# Patient Record
Sex: FEMALE | Race: WHITE | NOT HISPANIC OR LATINO | Employment: FULL TIME | RURAL
[De-identification: names, ages, dates, MRNs, and addresses within clinical notes are randomized per-mention and may not be internally consistent; named-entity substitution may affect disease eponyms.]

---

## 2020-06-24 ENCOUNTER — HISTORICAL (OUTPATIENT)
Dept: ADMINISTRATIVE | Facility: HOSPITAL | Age: 50
End: 2020-06-24

## 2020-06-30 LAB
LAB AP CLINICAL INFORMATION: NORMAL
LAB AP COMMENTS: NORMAL
LAB AP GENERAL CAT - HISTORICAL: NORMAL
LAB AP INTERPRETATION/RESULT - HISTORICAL: NEGATIVE
LAB AP SPECIMEN ADEQUACY - HISTORICAL: NORMAL
LAB AP SPECIMEN SUBMITTED - HISTORICAL: NORMAL

## 2021-07-12 ENCOUNTER — OFFICE VISIT (OUTPATIENT)
Dept: FAMILY MEDICINE | Facility: CLINIC | Age: 51
End: 2021-07-12
Payer: COMMERCIAL

## 2021-07-12 VITALS
BODY MASS INDEX: 33.6 KG/M2 | TEMPERATURE: 98 F | HEIGHT: 63 IN | HEART RATE: 77 BPM | OXYGEN SATURATION: 97 % | DIASTOLIC BLOOD PRESSURE: 78 MMHG | WEIGHT: 189.63 LBS | SYSTOLIC BLOOD PRESSURE: 114 MMHG

## 2021-07-12 DIAGNOSIS — Z12.31 SCREENING MAMMOGRAM, ENCOUNTER FOR: ICD-10-CM

## 2021-07-12 DIAGNOSIS — Z12.11 SCREENING FOR COLON CANCER: ICD-10-CM

## 2021-07-12 DIAGNOSIS — Z00.00 ENCOUNTER FOR WELLNESS EXAMINATION: Primary | ICD-10-CM

## 2021-07-12 DIAGNOSIS — R73.9 HYPERGLYCEMIA: ICD-10-CM

## 2021-07-12 DIAGNOSIS — Z13.1 SCREENING FOR DIABETES MELLITUS: ICD-10-CM

## 2021-07-12 DIAGNOSIS — Z13.220 SCREENING FOR LIPOID DISORDERS: ICD-10-CM

## 2021-07-12 DIAGNOSIS — Z12.4 PAP SMEAR FOR CERVICAL CANCER SCREENING: ICD-10-CM

## 2021-07-12 PROCEDURE — 99396 PR PREVENTIVE VISIT,EST,40-64: ICD-10-PCS | Mod: ,,, | Performed by: NURSE PRACTITIONER

## 2021-07-12 PROCEDURE — 82945 GLUCOSE, RANDOM: ICD-10-PCS | Mod: ,,, | Performed by: CLINICAL MEDICAL LABORATORY

## 2021-07-12 PROCEDURE — 99396 PREV VISIT EST AGE 40-64: CPT | Mod: ,,, | Performed by: NURSE PRACTITIONER

## 2021-07-12 PROCEDURE — 80061 LIPID PANEL: ICD-10-PCS | Mod: ,,, | Performed by: CLINICAL MEDICAL LABORATORY

## 2021-07-12 PROCEDURE — 88142 CYTOPATH C/V THIN LAYER: CPT | Mod: GCY | Performed by: NURSE PRACTITIONER

## 2021-07-12 PROCEDURE — 82945 GLUCOSE OTHER FLUID: CPT | Mod: ,,, | Performed by: CLINICAL MEDICAL LABORATORY

## 2021-07-12 PROCEDURE — 80061 LIPID PANEL: CPT | Mod: ,,, | Performed by: CLINICAL MEDICAL LABORATORY

## 2021-07-12 RX ORDER — ROSUVASTATIN CALCIUM 10 MG/1
10 TABLET, COATED ORAL NIGHTLY
COMMUNITY
Start: 2021-07-01 | End: 2021-11-04 | Stop reason: SDUPTHER

## 2021-07-12 RX ORDER — GABAPENTIN 800 MG/1
800 TABLET ORAL 3 TIMES DAILY
COMMUNITY
Start: 2021-05-21 | End: 2021-11-01

## 2021-07-12 RX ORDER — LISINOPRIL AND HYDROCHLOROTHIAZIDE 12.5; 2 MG/1; MG/1
1 TABLET ORAL
COMMUNITY
End: 2021-11-04 | Stop reason: SDUPTHER

## 2021-07-12 RX ORDER — METFORMIN HYDROCHLORIDE 500 MG/1
TABLET ORAL
COMMUNITY
Start: 2021-03-24 | End: 2022-04-13 | Stop reason: SDUPTHER

## 2021-07-13 LAB
CHOLEST SERPL-MCNC: 191 MG/DL (ref 0–200)
CHOLEST/HDLC SERPL: 4 {RATIO}
GLUCOSE SERPL-MCNC: 203 MG/DL (ref 74–106)
HDLC SERPL-MCNC: 48 MG/DL (ref 40–60)
LDLC SERPL CALC-MCNC: 76 MG/DL
LDLC/HDLC SERPL: 1.6 {RATIO}
NONHDLC SERPL-MCNC: 143 MG/DL
TRIGL SERPL-MCNC: 333 MG/DL (ref 35–150)
VLDLC SERPL-MCNC: 67 MG/DL

## 2021-07-14 DIAGNOSIS — R73.9 HYPERGLYCEMIA: Primary | ICD-10-CM

## 2021-07-14 LAB
GH SERPL-MCNC: NORMAL NG/ML
INSULIN SERPL-ACNC: NORMAL U[IU]/ML
LAB AP CLINICAL INFORMATION: NORMAL
LAB AP GYN INTERPRETATION: NEGATIVE
LAB AP PAP DISCLAIMER COMMENTS: NORMAL
RENIN PLAS-CCNC: NORMAL NG/ML/H

## 2021-07-14 PROCEDURE — 83036 HEMOGLOBIN GLYCOSYLATED A1C: CPT | Mod: ,,, | Performed by: CLINICAL MEDICAL LABORATORY

## 2021-07-14 PROCEDURE — 83036 HEMOGLOBIN A1C: ICD-10-PCS | Mod: ,,, | Performed by: CLINICAL MEDICAL LABORATORY

## 2021-07-15 LAB
EST. AVERAGE GLUCOSE BLD GHB EST-MCNC: 184 MG/DL
HBA1C MFR BLD HPLC: 8.1 % (ref 4.5–6.6)

## 2021-10-11 PROBLEM — Z00.00 ENCOUNTER FOR WELLNESS EXAMINATION: Status: RESOLVED | Noted: 2021-07-12 | Resolved: 2021-10-11

## 2021-11-04 ENCOUNTER — OFFICE VISIT (OUTPATIENT)
Dept: FAMILY MEDICINE | Facility: CLINIC | Age: 51
End: 2021-11-04
Payer: COMMERCIAL

## 2021-11-04 VITALS
WEIGHT: 186.38 LBS | SYSTOLIC BLOOD PRESSURE: 127 MMHG | DIASTOLIC BLOOD PRESSURE: 84 MMHG | OXYGEN SATURATION: 96 % | BODY MASS INDEX: 33.02 KG/M2 | HEART RATE: 76 BPM | TEMPERATURE: 97 F | HEIGHT: 63 IN

## 2021-11-04 DIAGNOSIS — I10 ESSENTIAL HYPERTENSION: ICD-10-CM

## 2021-11-04 DIAGNOSIS — E11.9 TYPE 2 DIABETES MELLITUS WITHOUT COMPLICATION, WITHOUT LONG-TERM CURRENT USE OF INSULIN: Primary | ICD-10-CM

## 2021-11-04 LAB
EST. AVERAGE GLUCOSE BLD GHB EST-MCNC: 190 MG/DL
HBA1C MFR BLD HPLC: 8.3 % (ref 4.5–6.6)

## 2021-11-04 PROCEDURE — 99213 OFFICE O/P EST LOW 20 MIN: CPT | Mod: ,,, | Performed by: NURSE PRACTITIONER

## 2021-11-04 PROCEDURE — 83036 HEMOGLOBIN GLYCOSYLATED A1C: CPT | Mod: ,,, | Performed by: CLINICAL MEDICAL LABORATORY

## 2021-11-04 PROCEDURE — 3052F PR MOST RECENT HEMOGLOBIN A1C LEVEL 8.0 - < 9.0%: ICD-10-PCS | Mod: ,,, | Performed by: NURSE PRACTITIONER

## 2021-11-04 PROCEDURE — 83036 HEMOGLOBIN A1C: ICD-10-PCS | Mod: ,,, | Performed by: CLINICAL MEDICAL LABORATORY

## 2021-11-04 PROCEDURE — 99213 PR OFFICE/OUTPT VISIT, EST, LEVL III, 20-29 MIN: ICD-10-PCS | Mod: ,,, | Performed by: NURSE PRACTITIONER

## 2021-11-04 PROCEDURE — 3052F HG A1C>EQUAL 8.0%<EQUAL 9.0%: CPT | Mod: ,,, | Performed by: NURSE PRACTITIONER

## 2021-11-04 RX ORDER — LISINOPRIL AND HYDROCHLOROTHIAZIDE 12.5; 2 MG/1; MG/1
1 TABLET ORAL DAILY
Qty: 90 TABLET | Refills: 1 | Status: SHIPPED | OUTPATIENT
Start: 2021-11-04 | End: 2022-04-13 | Stop reason: SDUPTHER

## 2021-11-04 RX ORDER — GABAPENTIN 800 MG/1
800 TABLET ORAL 3 TIMES DAILY
Qty: 270 TABLET | Refills: 2 | Status: SHIPPED | OUTPATIENT
Start: 2021-11-04 | End: 2022-04-13 | Stop reason: SDUPTHER

## 2021-11-04 RX ORDER — ROSUVASTATIN CALCIUM 10 MG/1
10 TABLET, COATED ORAL NIGHTLY
Qty: 90 TABLET | Refills: 1 | Status: SHIPPED | OUTPATIENT
Start: 2021-11-04 | End: 2022-04-13 | Stop reason: SDUPTHER

## 2022-04-13 ENCOUNTER — OFFICE VISIT (OUTPATIENT)
Dept: FAMILY MEDICINE | Facility: CLINIC | Age: 52
End: 2022-04-13
Payer: COMMERCIAL

## 2022-04-13 VITALS
HEIGHT: 63 IN | TEMPERATURE: 98 F | WEIGHT: 177.81 LBS | HEART RATE: 81 BPM | DIASTOLIC BLOOD PRESSURE: 74 MMHG | OXYGEN SATURATION: 98 % | SYSTOLIC BLOOD PRESSURE: 115 MMHG | BODY MASS INDEX: 31.5 KG/M2

## 2022-04-13 DIAGNOSIS — E78.5 HYPERLIPIDEMIA, UNSPECIFIED HYPERLIPIDEMIA TYPE: ICD-10-CM

## 2022-04-13 DIAGNOSIS — I10 ESSENTIAL HYPERTENSION: ICD-10-CM

## 2022-04-13 DIAGNOSIS — G62.9 NEUROPATHY: ICD-10-CM

## 2022-04-13 DIAGNOSIS — E11.9 TYPE 2 DIABETES MELLITUS WITHOUT COMPLICATION, WITHOUT LONG-TERM CURRENT USE OF INSULIN: Primary | ICD-10-CM

## 2022-04-13 LAB
BASOPHILS # BLD AUTO: 0.06 K/UL (ref 0–0.2)
BASOPHILS NFR BLD AUTO: 0.8 % (ref 0–1)
BILIRUB UR QL STRIP: NEGATIVE
CLARITY UR: CLEAR
COLOR UR: YELLOW
DIFFERENTIAL METHOD BLD: ABNORMAL
EOSINOPHIL # BLD AUTO: 0.29 K/UL (ref 0–0.5)
EOSINOPHIL NFR BLD AUTO: 3.7 % (ref 1–4)
ERYTHROCYTE [DISTWIDTH] IN BLOOD BY AUTOMATED COUNT: 12.2 % (ref 11.5–14.5)
GLUCOSE UR STRIP-MCNC: 500 MG/DL
HCT VFR BLD AUTO: 43.4 % (ref 38–47)
HGB BLD-MCNC: 14.7 G/DL (ref 12–16)
IMM GRANULOCYTES # BLD AUTO: 0.02 K/UL (ref 0–0.04)
IMM GRANULOCYTES NFR BLD: 0.3 % (ref 0–0.4)
KETONES UR STRIP-SCNC: ABNORMAL MG/DL
LEUKOCYTE ESTERASE UR QL STRIP: ABNORMAL
LYMPHOCYTES # BLD AUTO: 1.91 K/UL (ref 1–4.8)
LYMPHOCYTES NFR BLD AUTO: 24.4 % (ref 27–41)
MCH RBC QN AUTO: 30.6 PG (ref 27–31)
MCHC RBC AUTO-ENTMCNC: 33.9 G/DL (ref 32–36)
MCV RBC AUTO: 90.2 FL (ref 80–96)
MONOCYTES # BLD AUTO: 0.69 K/UL (ref 0–0.8)
MONOCYTES NFR BLD AUTO: 8.8 % (ref 2–6)
MPC BLD CALC-MCNC: 9.7 FL (ref 9.4–12.4)
NEUTROPHILS # BLD AUTO: 4.85 K/UL (ref 1.8–7.7)
NEUTROPHILS NFR BLD AUTO: 62 % (ref 53–65)
NITRITE UR QL STRIP: NEGATIVE
NRBC # BLD AUTO: 0 X10E3/UL
NRBC, AUTO (.00): 0 %
PH UR STRIP: 6 PH UNITS
PLATELET # BLD AUTO: 346 K/UL (ref 150–400)
PROT UR QL STRIP: NEGATIVE
RBC # BLD AUTO: 4.81 M/UL (ref 4.2–5.4)
RBC # UR STRIP: ABNORMAL /UL
RBC #/AREA URNS HPF: ABNORMAL /HPF
SP GR UR STRIP: 1.02
SQUAMOUS #/AREA URNS LPF: ABNORMAL /LPF
UROBILINOGEN UR STRIP-ACNC: 1 MG/DL
WBC # BLD AUTO: 7.82 K/UL (ref 4.5–11)
WBC #/AREA URNS HPF: ABNORMAL /HPF

## 2022-04-13 PROCEDURE — 1160F PR REVIEW ALL MEDS BY PRESCRIBER/CLIN PHARMACIST DOCUMENTED: ICD-10-PCS | Mod: ,,, | Performed by: REGISTERED NURSE

## 2022-04-13 PROCEDURE — 3008F BODY MASS INDEX DOCD: CPT | Mod: ,,, | Performed by: REGISTERED NURSE

## 2022-04-13 PROCEDURE — 3051F PR MOST RECENT HEMOGLOBIN A1C LEVEL 7.0 - < 8.0%: ICD-10-PCS | Mod: ,,, | Performed by: REGISTERED NURSE

## 2022-04-13 PROCEDURE — 3051F HG A1C>EQUAL 7.0%<8.0%: CPT | Mod: ,,, | Performed by: REGISTERED NURSE

## 2022-04-13 PROCEDURE — 3078F PR MOST RECENT DIASTOLIC BLOOD PRESSURE < 80 MM HG: ICD-10-PCS | Mod: ,,, | Performed by: REGISTERED NURSE

## 2022-04-13 PROCEDURE — 3078F DIAST BP <80 MM HG: CPT | Mod: ,,, | Performed by: REGISTERED NURSE

## 2022-04-13 PROCEDURE — 1159F MED LIST DOCD IN RCRD: CPT | Mod: ,,, | Performed by: REGISTERED NURSE

## 2022-04-13 PROCEDURE — 82570 ASSAY OF URINE CREATININE: CPT | Mod: ,,, | Performed by: CLINICAL MEDICAL LABORATORY

## 2022-04-13 PROCEDURE — 81001 URINALYSIS AUTO W/SCOPE: CPT | Mod: ,,, | Performed by: CLINICAL MEDICAL LABORATORY

## 2022-04-13 PROCEDURE — 80053 COMPREHENSIVE METABOLIC PANEL: ICD-10-PCS | Mod: ,,, | Performed by: CLINICAL MEDICAL LABORATORY

## 2022-04-13 PROCEDURE — 82043 MICROALBUMIN / CREATININE RATIO URINE: ICD-10-PCS | Mod: ,,, | Performed by: CLINICAL MEDICAL LABORATORY

## 2022-04-13 PROCEDURE — 3008F PR BODY MASS INDEX (BMI) DOCUMENTED: ICD-10-PCS | Mod: ,,, | Performed by: REGISTERED NURSE

## 2022-04-13 PROCEDURE — 1159F PR MEDICATION LIST DOCUMENTED IN MEDICAL RECORD: ICD-10-PCS | Mod: ,,, | Performed by: REGISTERED NURSE

## 2022-04-13 PROCEDURE — 85025 COMPLETE CBC W/AUTO DIFF WBC: CPT | Mod: ,,, | Performed by: CLINICAL MEDICAL LABORATORY

## 2022-04-13 PROCEDURE — 1160F RVW MEDS BY RX/DR IN RCRD: CPT | Mod: ,,, | Performed by: REGISTERED NURSE

## 2022-04-13 PROCEDURE — 80053 COMPREHEN METABOLIC PANEL: CPT | Mod: ,,, | Performed by: CLINICAL MEDICAL LABORATORY

## 2022-04-13 PROCEDURE — 82043 UR ALBUMIN QUANTITATIVE: CPT | Mod: ,,, | Performed by: CLINICAL MEDICAL LABORATORY

## 2022-04-13 PROCEDURE — 99214 PR OFFICE/OUTPT VISIT, EST, LEVL IV, 30-39 MIN: ICD-10-PCS | Mod: ,,, | Performed by: REGISTERED NURSE

## 2022-04-13 PROCEDURE — 81001 URINALYSIS, REFLEX TO URINE CULTURE: ICD-10-PCS | Mod: ,,, | Performed by: CLINICAL MEDICAL LABORATORY

## 2022-04-13 PROCEDURE — 85025 CBC WITH DIFFERENTIAL: ICD-10-PCS | Mod: ,,, | Performed by: CLINICAL MEDICAL LABORATORY

## 2022-04-13 PROCEDURE — 83036 HEMOGLOBIN GLYCOSYLATED A1C: CPT | Mod: ,,, | Performed by: CLINICAL MEDICAL LABORATORY

## 2022-04-13 PROCEDURE — 80061 LIPID PANEL: ICD-10-PCS | Mod: ,,, | Performed by: CLINICAL MEDICAL LABORATORY

## 2022-04-13 PROCEDURE — 3061F PR NEG MICROALBUMINURIA RESULT DOCUMENTED/REVIEW: ICD-10-PCS | Mod: ,,, | Performed by: REGISTERED NURSE

## 2022-04-13 PROCEDURE — 99214 OFFICE O/P EST MOD 30 MIN: CPT | Mod: ,,, | Performed by: REGISTERED NURSE

## 2022-04-13 PROCEDURE — 3066F NEPHROPATHY DOC TX: CPT | Mod: ,,, | Performed by: REGISTERED NURSE

## 2022-04-13 PROCEDURE — 3061F NEG MICROALBUMINURIA REV: CPT | Mod: ,,, | Performed by: REGISTERED NURSE

## 2022-04-13 PROCEDURE — 80061 LIPID PANEL: CPT | Mod: ,,, | Performed by: CLINICAL MEDICAL LABORATORY

## 2022-04-13 PROCEDURE — 3074F SYST BP LT 130 MM HG: CPT | Mod: ,,, | Performed by: REGISTERED NURSE

## 2022-04-13 PROCEDURE — 3066F PR DOCUMENTATION OF TREATMENT FOR NEPHROPATHY: ICD-10-PCS | Mod: ,,, | Performed by: REGISTERED NURSE

## 2022-04-13 PROCEDURE — 3074F PR MOST RECENT SYSTOLIC BLOOD PRESSURE < 130 MM HG: ICD-10-PCS | Mod: ,,, | Performed by: REGISTERED NURSE

## 2022-04-13 PROCEDURE — 4010F PR ACE/ARB THEARPY RXD/TAKEN: ICD-10-PCS | Mod: ,,, | Performed by: REGISTERED NURSE

## 2022-04-13 PROCEDURE — 4010F ACE/ARB THERAPY RXD/TAKEN: CPT | Mod: ,,, | Performed by: REGISTERED NURSE

## 2022-04-13 PROCEDURE — 82570 MICROALBUMIN / CREATININE RATIO URINE: ICD-10-PCS | Mod: ,,, | Performed by: CLINICAL MEDICAL LABORATORY

## 2022-04-13 PROCEDURE — 83036 HEMOGLOBIN A1C: ICD-10-PCS | Mod: ,,, | Performed by: CLINICAL MEDICAL LABORATORY

## 2022-04-13 RX ORDER — METFORMIN HYDROCHLORIDE 500 MG/1
TABLET ORAL
Qty: 225 TABLET | Refills: 1 | Status: SHIPPED | OUTPATIENT
Start: 2022-04-13 | End: 2022-07-27 | Stop reason: SDUPTHER

## 2022-04-13 RX ORDER — ROSUVASTATIN CALCIUM 10 MG/1
10 TABLET, COATED ORAL NIGHTLY
Qty: 90 TABLET | Refills: 1 | Status: SHIPPED | OUTPATIENT
Start: 2022-04-13 | End: 2022-04-19 | Stop reason: DRUGHIGH

## 2022-04-13 RX ORDER — LISINOPRIL AND HYDROCHLOROTHIAZIDE 12.5; 2 MG/1; MG/1
1 TABLET ORAL DAILY
Qty: 90 TABLET | Refills: 1 | Status: SHIPPED | OUTPATIENT
Start: 2022-04-13 | End: 2022-07-27 | Stop reason: SDUPTHER

## 2022-04-13 RX ORDER — GABAPENTIN 800 MG/1
800 TABLET ORAL 3 TIMES DAILY
Qty: 270 TABLET | Refills: 1 | Status: SHIPPED | OUTPATIENT
Start: 2022-04-13 | End: 2022-11-16 | Stop reason: SDUPTHER

## 2022-04-13 NOTE — PROGRESS NOTES
WADE Means        PATIENT NAME: Alisa Olguin  : 1970  DATE: 22  MRN: [id number]      Billing Provider: WADE Means  Level of Service: AL OFFICE/OUTPT VISIT, EST, LEVL IV, 30-39 MIN  Patient PCP Information     Provider PCP Type    WADE Means General          Reason for Visit / Chief Complaint: Establish Care and Diabetes       Update PCP  Update Chief Complaint         History of Present Illness / Problem Focused Workflow      HPI Miss Cuellar is a 50 y/o WF here to establish care with new pcp. She needs medication refills and lab work.     Review of Systems     Review of Systems   Constitutional: Negative.    HENT: Negative.    Respiratory: Negative for cough, choking, chest tightness and shortness of breath.    Cardiovascular: Negative for chest pain and leg swelling.   Gastrointestinal: Negative for abdominal pain, change in bowel habit, diarrhea, nausea, vomiting, reflux and change in bowel habit.   Genitourinary: Negative.  Negative for decreased urine volume, difficulty urinating, dysuria, frequency, hematuria and urgency.   Musculoskeletal: Positive for leg pain.   Integumentary:  Negative.   Neurological: Positive for numbness and headaches.   Psychiatric/Behavioral: Negative.       Medical / Social / Family History     Past Medical History:   Diagnosis Date    Diabetes mellitus, type 2     Hyperlipidemia     Hypertension        Past Surgical History:   Procedure Laterality Date    TUBAL LIGATION       Social History  Ms. Alisa Olguin  reports that she has never smoked. She has never used smokeless tobacco. She reports previous alcohol use. She reports that she does not use drugs.    Family History  Ms. Alisa Olguin's family history is not on file.    Medications and Allergies     Medications  No outpatient medications have been marked as taking for the 22 encounter (Office Visit) with WADE Means.     Allergies  Review of patient's  allergies indicates:  No Known Allergies    Physical Examination     Vitals:    04/13/22 0838   BP: 115/74   Pulse: 81   Temp: 98.2 °F (36.8 °C)     Physical Exam  Vitals and nursing note reviewed.   Constitutional:       Appearance: Normal appearance. She is obese.   HENT:      Head: Normocephalic and atraumatic.   Cardiovascular:      Rate and Rhythm: Normal rate and regular rhythm.      Pulses:           Dorsalis pedis pulses are 2+ on the right side and 2+ on the left side.        Posterior tibial pulses are 2+ on the right side and 2+ on the left side.      Heart sounds: Normal heart sounds.   Pulmonary:      Effort: Pulmonary effort is normal.      Breath sounds: Normal breath sounds.   Musculoskeletal:         General: Normal range of motion.      Cervical back: Normal range of motion.   Feet:      Right foot:      Protective Sensation: 2 sites tested. 2 sites sensed.      Skin integrity: Dry skin present.      Toenail Condition: Right toenails are abnormally thick.      Left foot:      Protective Sensation: 2 sites tested. 2 sites sensed.      Skin integrity: Dry skin present.      Toenail Condition: Left toenails are abnormally thick.   Skin:     General: Skin is warm and dry.   Neurological:      Mental Status: She is alert and oriented to person, place, and time.      Comments: Peripheral neuropathy affecting bilateral lower extremities secondary to diabetes.         Assessment and Plan (including Health Maintenance)      Problem List  Smart Sets  Document Outside HM   Plan:   Type 2 diabetes mellitus without complication, without long-term current use of insulin  -     metFORMIN (GLUCOPHAGE) 500 MG tablet; TAKE 1 & 1 2 (ONE & ONE HALF) TABLETS BY MOUTH IN THE MORNING AND 1 NIGHTLY  Dispense: 225 tablet; Refill: 1  -     CBC Auto Differential; Future; Expected date: 04/13/2022  -     Microalbumin/Creatinine Ratio, Urine; Future  -     Urinalysis, Reflex to Urine Culture; Future; Expected date:  04/13/2022  -     Hemoglobin A1C; Future; Expected date: 04/13/2022  -     Urinalysis, Microscopic    Essential hypertension  -     lisinopriL-hydrochlorothiazide (PRINZIDE,ZESTORETIC) 20-12.5 mg per tablet; Take 1 tablet by mouth once daily.  Dispense: 90 tablet; Refill: 1  -     Comprehensive Metabolic Panel; Future; Expected date: 04/13/2022  -     Microalbumin/Creatinine Ratio, Urine; Future  -     Urinalysis, Reflex to Urine Culture; Future; Expected date: 04/13/2022  -     Urinalysis, Microscopic    Hyperlipidemia, unspecified hyperlipidemia type  -     Discontinue: rosuvastatin (CRESTOR) 10 MG tablet; Take 1 tablet (10 mg total) by mouth nightly.  Dispense: 90 tablet; Refill: 1  -     Lipid Panel; Future; Expected date: 04/13/2022  -     rosuvastatin (CRESTOR) 20 MG tablet; Take 1 tablet (20 mg total) by mouth once daily.  Dispense: 90 tablet; Refill: 0    Neuropathy  -     gabapentin (NEURONTIN) 800 MG tablet; Take 1 tablet (800 mg total) by mouth 3 (three) times daily.  Dispense: 270 tablet; Refill: 1      Health Maintenance Due   Topic Date Due    Hepatitis C Screening  Never done    COVID-19 Vaccine (1) Never done    Foot Exam  Never done    Eye Exam  Never done    HIV Screening  Never done    TETANUS VACCINE  Never done    Mammogram  Never done    Colorectal Cancer Screening  Never done    Shingles Vaccine (1 of 2) Never done     Problem List Items Addressed This Visit    None     Visit Diagnoses     Type 2 diabetes mellitus without complication, without long-term current use of insulin    -  Primary    Relevant Medications    metFORMIN (GLUCOPHAGE) 500 MG tablet    Other Relevant Orders    CBC Auto Differential (Completed)    Microalbumin/Creatinine Ratio, Urine (Completed)    Urinalysis, Reflex to Urine Culture (Completed)    Hemoglobin A1C (Completed)    Urinalysis, Microscopic (Completed)    Essential hypertension        Relevant Medications    lisinopriL-hydrochlorothiazide  (PRINZIDE,ZESTORETIC) 20-12.5 mg per tablet    Other Relevant Orders    Comprehensive Metabolic Panel (Completed)    Microalbumin/Creatinine Ratio, Urine (Completed)    Urinalysis, Reflex to Urine Culture (Completed)    Urinalysis, Microscopic (Completed)    Hyperlipidemia, unspecified hyperlipidemia type        Relevant Medications    rosuvastatin (CRESTOR) 20 MG tablet    Other Relevant Orders    Lipid Panel (Completed)    Neuropathy        Relevant Medications    gabapentin (NEURONTIN) 800 MG tablet        Health Maintenance Topics with due status: Not Due       Topic Last Completion Date    Cervical Cancer Screening 2021    Low Dose Statin 2022    Diabetes Urine Screening 2022    Lipid Panel 2022    Hemoglobin A1c 2022     Future Appointments   Date Time Provider Department Center   2022  8:00 AM WADE Means [facility]      Patient Instructions   Do not stop or start any new medications without consulting with your provider. Make sure you are drinking plenty of water, at least 6-8 glasses per day.     We will call you with your lab results when results are available.     To avoid serious foot problems that could result in losing a toe, foot or leg, follow these guidelines.  Inspect your feet daily. Check for cuts, blisters, redness, swelling or nail problems. Use a magnifying hand mirror to look at the bottom of your feet. Call your doctor if you notice anything.    Bathe feet in lukewarm, never hot, water. Keep your feet clean by washing them daily. Use only lukewarm water--the temperature you would use on a  baby.    Be gentle when bathing your feet. Wash them using a soft washcloth or sponge. Dry by blotting or patting and carefully dry between the toes.    Moisturize your feet but not between your toes. Use a moisturizer daily to keep dry skin from itching or cracking. But don't moisturize between the toes--that could encourage a fungal  infection.    Cut nails carefully. Cut them straight across and file the edges. Dont cut nails too short, as this could lead to ingrown toenails. If you have concerns about your nails, consult your doctor.    Never treat corns or calluses yourself. No bathroom surgery or medicated pads. Visit your doctor for appropriate treatment.    Wear clean, dry socks. Change them daily.    Consider socks made specifically for patients living with diabetes. These socks have extra cushioning, do not have elastic tops, are higher than the ankle and are made from fibers that wick moisture away from the skin.    Wear socks to bed. If your feet get cold at night, wear socks. Never use a heating pad or a hot water bottle.    Shake out your shoes and feel the inside before wearing. Remember, your feet may not be able to feel a pebble or other foreign object, so always inspect your shoes before putting them on.    Keep your feet warm and dry. Dont let your feet get wet in snow or rain. Wear warm socks and shoes in winter.    Consider using an antiperspirant on the soles of your feet. This is helpful if you have excessive sweating of the feet.    Never walk barefoot. Not even at home! Always wear shoes or slippers. You could step on something and get a scratch or cut.    Take care of your diabetes. Keep your blood sugar levels under control.    Do not smoke. Smoking restricts blood flow in your feet.    Get periodic foot exams. Seeing your foot and ankle surgeon on a regular basis can help prevent the foot complications of diabetes.    Follow up in about 6 months (around 10/13/2022).     Signature:  WADE Means      Date of encounter: 4/13/22

## 2022-04-13 NOTE — PATIENT INSTRUCTIONS
Do not stop or start any new medications without consulting with your provider. Make sure you are drinking plenty of water, at least 6-8 glasses per day.     We will call you with your lab results when results are available.     To avoid serious foot problems that could result in losing a toe, foot or leg, follow these guidelines.  Inspect your feet daily. Check for cuts, blisters, redness, swelling or nail problems. Use a magnifying hand mirror to look at the bottom of your feet. Call your doctor if you notice anything.    Bathe feet in lukewarm, never hot, water. Keep your feet clean by washing them daily. Use only lukewarm water--the temperature you would use on a  baby.    Be gentle when bathing your feet. Wash them using a soft washcloth or sponge. Dry by blotting or patting and carefully dry between the toes.    Moisturize your feet but not between your toes. Use a moisturizer daily to keep dry skin from itching or cracking. But don't moisturize between the toes--that could encourage a fungal infection.    Cut nails carefully. Cut them straight across and file the edges. Dont cut nails too short, as this could lead to ingrown toenails. If you have concerns about your nails, consult your doctor.    Never treat corns or calluses yourself. No bathroom surgery or medicated pads. Visit your doctor for appropriate treatment.    Wear clean, dry socks. Change them daily.    Consider socks made specifically for patients living with diabetes. These socks have extra cushioning, do not have elastic tops, are higher than the ankle and are made from fibers that wick moisture away from the skin.    Wear socks to bed. If your feet get cold at night, wear socks. Never use a heating pad or a hot water bottle.    Shake out your shoes and feel the inside before wearing. Remember, your feet may not be able to feel a pebble or other foreign object, so always inspect your shoes before putting them on.    Keep your feet  warm and dry. Dont let your feet get wet in snow or rain. Wear warm socks and shoes in winter.    Consider using an antiperspirant on the soles of your feet. This is helpful if you have excessive sweating of the feet.    Never walk barefoot. Not even at home! Always wear shoes or slippers. You could step on something and get a scratch or cut.    Take care of your diabetes. Keep your blood sugar levels under control.    Do not smoke. Smoking restricts blood flow in your feet.    Get periodic foot exams. Seeing your foot and ankle surgeon on a regular basis can help prevent the foot complications of diabetes.

## 2022-04-14 LAB
ALBUMIN SERPL BCP-MCNC: 4.1 G/DL (ref 3.5–5)
ALBUMIN/GLOB SERPL: 1.1 {RATIO}
ALP SERPL-CCNC: 97 U/L (ref 41–108)
ALT SERPL W P-5'-P-CCNC: 48 U/L (ref 13–56)
ANION GAP SERPL CALCULATED.3IONS-SCNC: 11 MMOL/L (ref 7–16)
AST SERPL W P-5'-P-CCNC: 19 U/L (ref 15–37)
BILIRUB SERPL-MCNC: 0.5 MG/DL (ref 0–1.2)
BUN SERPL-MCNC: 21 MG/DL (ref 7–18)
BUN/CREAT SERPL: 23 (ref 6–20)
CALCIUM SERPL-MCNC: 9.5 MG/DL (ref 8.5–10.1)
CHLORIDE SERPL-SCNC: 102 MMOL/L (ref 98–107)
CHOLEST SERPL-MCNC: 310 MG/DL (ref 0–200)
CHOLEST/HDLC SERPL: 6.2 {RATIO}
CO2 SERPL-SCNC: 29 MMOL/L (ref 21–32)
CREAT SERPL-MCNC: 0.9 MG/DL (ref 0.55–1.02)
CREAT UR-MCNC: 77 MG/DL (ref 28–219)
EST. AVERAGE GLUCOSE BLD GHB EST-MCNC: 157 MG/DL
GLOBULIN SER-MCNC: 3.7 G/DL (ref 2–4)
GLUCOSE SERPL-MCNC: 166 MG/DL (ref 74–106)
HBA1C MFR BLD HPLC: 7.3 % (ref 4.5–6.6)
HDLC SERPL-MCNC: 50 MG/DL (ref 40–60)
LDLC SERPL CALC-MCNC: 203 MG/DL
LDLC/HDLC SERPL: 4.1 {RATIO}
MICROALBUMIN UR-MCNC: 2.1 MG/DL (ref 0–2.8)
MICROALBUMIN/CREAT RATIO PNL UR: 27.3 MG/G (ref 0–30)
NONHDLC SERPL-MCNC: 260 MG/DL
POTASSIUM SERPL-SCNC: 4.8 MMOL/L (ref 3.5–5.1)
PROT SERPL-MCNC: 7.8 G/DL (ref 6.4–8.2)
SODIUM SERPL-SCNC: 137 MMOL/L (ref 136–145)
TRIGL SERPL-MCNC: 286 MG/DL (ref 35–150)
VLDLC SERPL-MCNC: 57 MG/DL

## 2022-04-19 ENCOUNTER — TELEPHONE (OUTPATIENT)
Dept: FAMILY MEDICINE | Facility: CLINIC | Age: 52
End: 2022-04-19
Payer: COMMERCIAL

## 2022-04-19 RX ORDER — ROSUVASTATIN CALCIUM 20 MG/1
20 TABLET, COATED ORAL DAILY
Qty: 90 TABLET | Refills: 0 | Status: SHIPPED | OUTPATIENT
Start: 2022-04-19 | End: 2022-07-27 | Stop reason: SDUPTHER

## 2022-04-19 NOTE — TELEPHONE ENCOUNTER
----- Message from WADE Means sent at 4/19/2022  2:23 PM CDT -----  Please let Mrs. Olguin know her A1C has improved to 7.3 but her cholesterol has doubled and her Triglycerides are still elevated. She needs to increase her Crestor from 10mg daily to 20mg daily, she can take 2 of the 10mg tablets until prescription is gone and then  the new prescription I am sending to the pharmacy. She needs to get an OTC fish oil and take 1 capsule every night to help with Triglycerides and cholesterol and she needs to cut back on whole dairy products, this includes cheese, egg yolks, fried foods, and processed meats like hot dogs, sandwich meat, and diaz. She needs to return in about 3-4 months for repeat on her lab work. No changes made to diabetic medications, keep taking everything else the same way.

## 2022-04-19 NOTE — TELEPHONE ENCOUNTER
Discussed with pt; states has been out of crestor 10mg for a while; will  new rx for crestor 20mg and start taking; will rtc in 3-4 weeks for repeat lab.

## 2022-05-11 ENCOUNTER — OFFICE VISIT (OUTPATIENT)
Dept: FAMILY MEDICINE | Facility: CLINIC | Age: 52
End: 2022-05-11
Payer: COMMERCIAL

## 2022-05-11 VITALS
TEMPERATURE: 98 F | DIASTOLIC BLOOD PRESSURE: 84 MMHG | BODY MASS INDEX: 32.25 KG/M2 | SYSTOLIC BLOOD PRESSURE: 119 MMHG | WEIGHT: 182 LBS | OXYGEN SATURATION: 98 % | HEIGHT: 63 IN | HEART RATE: 68 BPM

## 2022-05-11 DIAGNOSIS — R53.83 FATIGUE, UNSPECIFIED TYPE: Primary | ICD-10-CM

## 2022-05-11 DIAGNOSIS — R53.1 WEAKNESS: ICD-10-CM

## 2022-05-11 LAB
25(OH)D3 SERPL-MCNC: 24 NG/ML
FOLATE SERPL-MCNC: 16.3 NG/ML (ref 3.1–17.5)
T4 FREE SERPL-MCNC: 1.08 NG/DL (ref 0.76–1.46)
TSH SERPL DL<=0.005 MIU/L-ACNC: 1.4 UIU/ML (ref 0.36–3.74)
VIT B12 SERPL-MCNC: 147 PG/ML (ref 193–986)

## 2022-05-11 PROCEDURE — 82607 VITAMIN B12/FOLATE, SERUM PANEL: ICD-10-PCS | Mod: ,,, | Performed by: CLINICAL MEDICAL LABORATORY

## 2022-05-11 PROCEDURE — 84443 ASSAY THYROID STIM HORMONE: CPT | Mod: ,,, | Performed by: CLINICAL MEDICAL LABORATORY

## 2022-05-11 PROCEDURE — 84443 TSH: ICD-10-PCS | Mod: ,,, | Performed by: CLINICAL MEDICAL LABORATORY

## 2022-05-11 PROCEDURE — 1159F PR MEDICATION LIST DOCUMENTED IN MEDICAL RECORD: ICD-10-PCS | Mod: ,,, | Performed by: REGISTERED NURSE

## 2022-05-11 PROCEDURE — 3074F SYST BP LT 130 MM HG: CPT | Mod: ,,, | Performed by: REGISTERED NURSE

## 2022-05-11 PROCEDURE — 3061F NEG MICROALBUMINURIA REV: CPT | Mod: ,,, | Performed by: REGISTERED NURSE

## 2022-05-11 PROCEDURE — 3051F PR MOST RECENT HEMOGLOBIN A1C LEVEL 7.0 - < 8.0%: ICD-10-PCS | Mod: ,,, | Performed by: REGISTERED NURSE

## 2022-05-11 PROCEDURE — 1160F RVW MEDS BY RX/DR IN RCRD: CPT | Mod: ,,, | Performed by: REGISTERED NURSE

## 2022-05-11 PROCEDURE — 4010F PR ACE/ARB THEARPY RXD/TAKEN: ICD-10-PCS | Mod: ,,, | Performed by: REGISTERED NURSE

## 2022-05-11 PROCEDURE — 84439 T4, FREE: ICD-10-PCS | Mod: ,,, | Performed by: CLINICAL MEDICAL LABORATORY

## 2022-05-11 PROCEDURE — 99214 OFFICE O/P EST MOD 30 MIN: CPT | Mod: ,,, | Performed by: REGISTERED NURSE

## 2022-05-11 PROCEDURE — 4010F ACE/ARB THERAPY RXD/TAKEN: CPT | Mod: ,,, | Performed by: REGISTERED NURSE

## 2022-05-11 PROCEDURE — 82306 VITAMIN D 25 HYDROXY: CPT | Mod: ,,, | Performed by: CLINICAL MEDICAL LABORATORY

## 2022-05-11 PROCEDURE — 99214 PR OFFICE/OUTPT VISIT, EST, LEVL IV, 30-39 MIN: ICD-10-PCS | Mod: ,,, | Performed by: REGISTERED NURSE

## 2022-05-11 PROCEDURE — 1160F PR REVIEW ALL MEDS BY PRESCRIBER/CLIN PHARMACIST DOCUMENTED: ICD-10-PCS | Mod: ,,, | Performed by: REGISTERED NURSE

## 2022-05-11 PROCEDURE — 1159F MED LIST DOCD IN RCRD: CPT | Mod: ,,, | Performed by: REGISTERED NURSE

## 2022-05-11 PROCEDURE — 82306 VITAMIN D: ICD-10-PCS | Mod: ,,, | Performed by: CLINICAL MEDICAL LABORATORY

## 2022-05-11 PROCEDURE — 3074F PR MOST RECENT SYSTOLIC BLOOD PRESSURE < 130 MM HG: ICD-10-PCS | Mod: ,,, | Performed by: REGISTERED NURSE

## 2022-05-11 PROCEDURE — 3061F PR NEG MICROALBUMINURIA RESULT DOCUMENTED/REVIEW: ICD-10-PCS | Mod: ,,, | Performed by: REGISTERED NURSE

## 2022-05-11 PROCEDURE — 3079F DIAST BP 80-89 MM HG: CPT | Mod: ,,, | Performed by: REGISTERED NURSE

## 2022-05-11 PROCEDURE — 3066F PR DOCUMENTATION OF TREATMENT FOR NEPHROPATHY: ICD-10-PCS | Mod: ,,, | Performed by: REGISTERED NURSE

## 2022-05-11 PROCEDURE — 82607 VITAMIN B-12: CPT | Mod: ,,, | Performed by: CLINICAL MEDICAL LABORATORY

## 2022-05-11 PROCEDURE — 82746 ASSAY OF FOLIC ACID SERUM: CPT | Mod: ,,, | Performed by: CLINICAL MEDICAL LABORATORY

## 2022-05-11 PROCEDURE — 82746 VITAMIN B12/FOLATE, SERUM PANEL: ICD-10-PCS | Mod: ,,, | Performed by: CLINICAL MEDICAL LABORATORY

## 2022-05-11 PROCEDURE — 84439 ASSAY OF FREE THYROXINE: CPT | Mod: ,,, | Performed by: CLINICAL MEDICAL LABORATORY

## 2022-05-11 PROCEDURE — 3066F NEPHROPATHY DOC TX: CPT | Mod: ,,, | Performed by: REGISTERED NURSE

## 2022-05-11 PROCEDURE — 3079F PR MOST RECENT DIASTOLIC BLOOD PRESSURE 80-89 MM HG: ICD-10-PCS | Mod: ,,, | Performed by: REGISTERED NURSE

## 2022-05-11 PROCEDURE — 3008F BODY MASS INDEX DOCD: CPT | Mod: ,,, | Performed by: REGISTERED NURSE

## 2022-05-11 PROCEDURE — 3008F PR BODY MASS INDEX (BMI) DOCUMENTED: ICD-10-PCS | Mod: ,,, | Performed by: REGISTERED NURSE

## 2022-05-11 PROCEDURE — 3051F HG A1C>EQUAL 7.0%<8.0%: CPT | Mod: ,,, | Performed by: REGISTERED NURSE

## 2022-05-11 RX ORDER — CYANOCOBALAMIN 1000 UG/ML
1000 INJECTION, SOLUTION INTRAMUSCULAR; SUBCUTANEOUS
Status: DISCONTINUED | OUTPATIENT
Start: 2022-05-11 | End: 2022-05-11

## 2022-05-11 NOTE — PROGRESS NOTES
WADE Means        PATIENT NAME: Alisa Olguin  : 1970  DATE: 22  MRN: [id number]      Billing Provider: WADE Means  Level of Service: NE OFFICE/OUTPT VISIT, EST, LEVL IV, 30-39 MIN  Patient PCP Information     Provider PCP Type    WADE Means General          Reason for Visit / Chief Complaint: Follow-up       Update PCP  Update Chief Complaint         History of Present Illness / Problem Focused Workflow      HPI Mrs. Olguin is here for follow-up on recent medication changes and review of lab work. We recently discussed her elevated cholesterol and started her on Crestor and OTC fish oil for high cholesterol and elevated triglycerides. She has questions about diet changes and ways to help lower her cholesterol. Good tolerance to new medication repor. She is concerned because of recent hair loss/hair growth changes, increased fatigue and weakness stating she does not have any energy. She reports having history of low vitamin d but has not taken supplements in 1-2 years.     Review of Systems     Review of Systems   Constitutional: Positive for activity change and fatigue. Negative for appetite change, chills and fever.   HENT: Negative.    Respiratory: Negative for cough, chest tightness and shortness of breath.    Cardiovascular: Negative for chest pain and leg swelling.   Gastrointestinal: Negative for abdominal pain, change in bowel habit, diarrhea, nausea, vomiting, reflux and change in bowel habit.   Genitourinary: Negative.    Musculoskeletal: Positive for arthralgias.   Neurological: Negative.    Psychiatric/Behavioral: Negative.       Medical / Social / Family History     Past Medical History:   Diagnosis Date    Diabetes mellitus, type 2     Hyperlipidemia     Hypertension        Past Surgical History:   Procedure Laterality Date    TUBAL LIGATION         Social History  Ms. Alisa Olguin  reports that she has never smoked. She has never used smokeless  tobacco. She reports previous alcohol use. She reports that she does not use drugs.    Family History  Ms. Alisa Olguin's family history is not on file.    Medications and Allergies     Medications  No outpatient medications have been marked as taking for the 5/11/22 encounter (Office Visit) with WADE Means.     Current Facility-Administered Medications for the 5/11/22 encounter (Office Visit) with WADE Means   Medication Dose Route Frequency Provider Last Rate Last Admin    [DISCONTINUED] cyanocobalamin injection 1,000 mcg  1,000 mcg Intramuscular 1 time in Clinic/HOD WADE Means           Allergies  Review of patient's allergies indicates:  No Known Allergies    Physical Examination     Vitals:    05/11/22 0829   BP: 119/84   Pulse: 68   Temp: 98.3 °F (36.8 °C)     Physical Exam  Vitals and nursing note reviewed.   Constitutional:       Appearance: Normal appearance. She is obese.   HENT:      Head: Normocephalic and atraumatic.      Comments: Patient reports noticing increased hair loss of recent. No bald spots found.     Nose: Nose normal.      Mouth/Throat:      Mouth: Mucous membranes are dry.      Pharynx: Oropharynx is clear.   Cardiovascular:      Rate and Rhythm: Normal rate and regular rhythm.      Heart sounds: Normal heart sounds.   Pulmonary:      Effort: Pulmonary effort is normal.      Breath sounds: Normal breath sounds.   Abdominal:      General: Abdomen is flat. Bowel sounds are normal.      Palpations: Abdomen is soft.   Musculoskeletal:         General: Normal range of motion.      Cervical back: Normal range of motion.      Comments: Muscle fatigue, weakness, low energy.    Skin:     General: Skin is warm and dry.   Neurological:      Mental Status: She is alert and oriented to person, place, and time.   Psychiatric:         Behavior: Behavior normal.       Assessment and Plan (including Health Maintenance)      Problem List  Smart Sets  Document Outside HM    Plan:   Fatigue, unspecified type  -     Discontinue: cyanocobalamin injection 1,000 mcg  -     TSH; Future; Expected date: 05/11/2022  -     T4, Free; Future; Expected date: 05/11/2022  -     Vitamin B12 & Folate; Future; Expected date: 05/11/2022  -     Vitamin D; Future; Expected date: 05/11/2022    Weakness  -     Discontinue: cyanocobalamin injection 1,000 mcg      Health Maintenance Due   Topic Date Due    Hepatitis C Screening  Never done    COVID-19 Vaccine (1) Never done    Eye Exam  Never done    HIV Screening  Never done    TETANUS VACCINE  Never done    Mammogram  Never done    Colorectal Cancer Screening  Never done    Shingles Vaccine (1 of 2) Never done       Problem List Items Addressed This Visit    None     Visit Diagnoses     Fatigue, unspecified type    -  Primary    Relevant Orders    TSH    T4, Free    Vitamin B12 & Folate    Vitamin D    Weakness              Health Maintenance Topics with due status: Not Due       Topic Last Completion Date    Cervical Cancer Screening 07/12/2021    Diabetes Urine Screening 04/13/2022    Foot Exam 04/13/2022    Lipid Panel 04/13/2022    Hemoglobin A1c 04/13/2022    Low Dose Statin 04/19/2022       Future Appointments   Date Time Provider Department Center   7/13/2022  8:00 AM WADE Means [facility]   8/11/2022  3:00 PM Remy Yancey MOISES [facility]        Patient Instructions   We will call you with the lab results when available. Take all medications as prescribed. Do not stop or start any medication(s) without consulting with your primary care provider or specialist. Make sure you are drinking plenty of water unless on a fluid restriction.   Cut salt intake, try to restrict sodium to 2 grams or less. Avoid fried foods and eat carbohydrates such as bread, pasta, and potatoes in moderation.    Follow up in about 3 months (around 8/11/2022).     Signature:  WADE Means      Date of encounter:  5/11/22

## 2022-05-11 NOTE — PATIENT INSTRUCTIONS
We will call you with the lab results when available. Take all medications as prescribed. Do not stop or start any medication(s) without consulting with your primary care provider or specialist. Make sure you are drinking plenty of water unless on a fluid restriction.   Cut salt intake, try to restrict sodium to 2 grams or less. Avoid fried foods and eat carbohydrates such as bread, pasta, and potatoes in moderation.

## 2022-05-17 ENCOUNTER — TELEPHONE (OUTPATIENT)
Dept: FAMILY MEDICINE | Facility: CLINIC | Age: 52
End: 2022-05-17
Payer: COMMERCIAL

## 2022-05-17 NOTE — TELEPHONE ENCOUNTER
----- Message from WADE Means sent at 5/12/2022  5:03 PM CDT -----  Please let Alisa know her vitamin B12 level is low and she could benefit from taking B12. The sublingual tablets 1,000mcg once per day to help get her levels back up. Normal range is 193-986 and hers is 147. Her vitamin d is 24 which is within normal range but on the lower end of normal. She can take otc vitamin d to help with this. Her thyroid levels are normal.

## 2022-07-27 ENCOUNTER — OFFICE VISIT (OUTPATIENT)
Dept: FAMILY MEDICINE | Facility: CLINIC | Age: 52
End: 2022-07-27
Payer: COMMERCIAL

## 2022-07-27 VITALS
SYSTOLIC BLOOD PRESSURE: 130 MMHG | WEIGHT: 183.38 LBS | TEMPERATURE: 98 F | DIASTOLIC BLOOD PRESSURE: 86 MMHG | HEIGHT: 63 IN | OXYGEN SATURATION: 99 % | BODY MASS INDEX: 32.49 KG/M2 | HEART RATE: 68 BPM

## 2022-07-27 DIAGNOSIS — E11.9 TYPE 2 DIABETES MELLITUS WITHOUT COMPLICATION, WITHOUT LONG-TERM CURRENT USE OF INSULIN: ICD-10-CM

## 2022-07-27 DIAGNOSIS — E78.5 HYPERLIPIDEMIA, UNSPECIFIED HYPERLIPIDEMIA TYPE: ICD-10-CM

## 2022-07-27 DIAGNOSIS — Z12.31 SCREENING MAMMOGRAM, ENCOUNTER FOR: ICD-10-CM

## 2022-07-27 DIAGNOSIS — Z00.00 ROUTINE GENERAL MEDICAL EXAMINATION AT A HEALTH CARE FACILITY: Primary | ICD-10-CM

## 2022-07-27 DIAGNOSIS — Z12.11 SCREENING FOR COLORECTAL CANCER: ICD-10-CM

## 2022-07-27 DIAGNOSIS — Z12.12 SCREENING FOR COLORECTAL CANCER: ICD-10-CM

## 2022-07-27 DIAGNOSIS — I10 ESSENTIAL HYPERTENSION: ICD-10-CM

## 2022-07-27 LAB
CHOLEST SERPL-MCNC: 206 MG/DL (ref 0–200)
CHOLEST/HDLC SERPL: 4.1 {RATIO}
EST. AVERAGE GLUCOSE BLD GHB EST-MCNC: 177 MG/DL
GLUCOSE SERPL-MCNC: 192 MG/DL (ref 74–106)
HBA1C MFR BLD HPLC: 7.9 % (ref 4.5–6.6)
HDLC SERPL-MCNC: 50 MG/DL (ref 40–60)
LDLC SERPL CALC-MCNC: 107 MG/DL
LDLC/HDLC SERPL: 2.1 {RATIO}
NONHDLC SERPL-MCNC: 156 MG/DL
TRIGL SERPL-MCNC: 247 MG/DL (ref 35–150)
VLDLC SERPL-MCNC: 49 MG/DL

## 2022-07-27 PROCEDURE — 4010F ACE/ARB THERAPY RXD/TAKEN: CPT | Mod: ,,, | Performed by: REGISTERED NURSE

## 2022-07-27 PROCEDURE — 3066F PR DOCUMENTATION OF TREATMENT FOR NEPHROPATHY: ICD-10-PCS | Mod: ,,, | Performed by: REGISTERED NURSE

## 2022-07-27 PROCEDURE — 3075F PR MOST RECENT SYSTOLIC BLOOD PRESS GE 130-139MM HG: ICD-10-PCS | Mod: ,,, | Performed by: REGISTERED NURSE

## 2022-07-27 PROCEDURE — 1159F MED LIST DOCD IN RCRD: CPT | Mod: ,,, | Performed by: REGISTERED NURSE

## 2022-07-27 PROCEDURE — 3061F PR NEG MICROALBUMINURIA RESULT DOCUMENTED/REVIEW: ICD-10-PCS | Mod: ,,, | Performed by: REGISTERED NURSE

## 2022-07-27 PROCEDURE — 80061 LIPID PANEL: ICD-10-PCS | Mod: ,,, | Performed by: CLINICAL MEDICAL LABORATORY

## 2022-07-27 PROCEDURE — 3066F NEPHROPATHY DOC TX: CPT | Mod: ,,, | Performed by: REGISTERED NURSE

## 2022-07-27 PROCEDURE — 82947 GLUCOSE, FASTING: ICD-10-PCS | Mod: ,,, | Performed by: CLINICAL MEDICAL LABORATORY

## 2022-07-27 PROCEDURE — 3008F PR BODY MASS INDEX (BMI) DOCUMENTED: ICD-10-PCS | Mod: ,,, | Performed by: REGISTERED NURSE

## 2022-07-27 PROCEDURE — 3079F PR MOST RECENT DIASTOLIC BLOOD PRESSURE 80-89 MM HG: ICD-10-PCS | Mod: ,,, | Performed by: REGISTERED NURSE

## 2022-07-27 PROCEDURE — 82947 ASSAY GLUCOSE BLOOD QUANT: CPT | Mod: ,,, | Performed by: CLINICAL MEDICAL LABORATORY

## 2022-07-27 PROCEDURE — 99396 PR PREVENTIVE VISIT,EST,40-64: ICD-10-PCS | Mod: ,,, | Performed by: REGISTERED NURSE

## 2022-07-27 PROCEDURE — 3051F HG A1C>EQUAL 7.0%<8.0%: CPT | Mod: ,,, | Performed by: REGISTERED NURSE

## 2022-07-27 PROCEDURE — 3075F SYST BP GE 130 - 139MM HG: CPT | Mod: ,,, | Performed by: REGISTERED NURSE

## 2022-07-27 PROCEDURE — 3061F NEG MICROALBUMINURIA REV: CPT | Mod: ,,, | Performed by: REGISTERED NURSE

## 2022-07-27 PROCEDURE — 83036 HEMOGLOBIN GLYCOSYLATED A1C: CPT | Mod: ,,, | Performed by: CLINICAL MEDICAL LABORATORY

## 2022-07-27 PROCEDURE — 3051F PR MOST RECENT HEMOGLOBIN A1C LEVEL 7.0 - < 8.0%: ICD-10-PCS | Mod: ,,, | Performed by: REGISTERED NURSE

## 2022-07-27 PROCEDURE — 99396 PREV VISIT EST AGE 40-64: CPT | Mod: ,,, | Performed by: REGISTERED NURSE

## 2022-07-27 PROCEDURE — 80061 LIPID PANEL: CPT | Mod: ,,, | Performed by: CLINICAL MEDICAL LABORATORY

## 2022-07-27 PROCEDURE — 4010F PR ACE/ARB THEARPY RXD/TAKEN: ICD-10-PCS | Mod: ,,, | Performed by: REGISTERED NURSE

## 2022-07-27 PROCEDURE — 1159F PR MEDICATION LIST DOCUMENTED IN MEDICAL RECORD: ICD-10-PCS | Mod: ,,, | Performed by: REGISTERED NURSE

## 2022-07-27 PROCEDURE — 1160F RVW MEDS BY RX/DR IN RCRD: CPT | Mod: ,,, | Performed by: REGISTERED NURSE

## 2022-07-27 PROCEDURE — 83036 HEMOGLOBIN A1C: ICD-10-PCS | Mod: ,,, | Performed by: CLINICAL MEDICAL LABORATORY

## 2022-07-27 PROCEDURE — 3008F BODY MASS INDEX DOCD: CPT | Mod: ,,, | Performed by: REGISTERED NURSE

## 2022-07-27 PROCEDURE — 1160F PR REVIEW ALL MEDS BY PRESCRIBER/CLIN PHARMACIST DOCUMENTED: ICD-10-PCS | Mod: ,,, | Performed by: REGISTERED NURSE

## 2022-07-27 PROCEDURE — 3079F DIAST BP 80-89 MM HG: CPT | Mod: ,,, | Performed by: REGISTERED NURSE

## 2022-07-27 RX ORDER — ROSUVASTATIN CALCIUM 20 MG/1
20 TABLET, COATED ORAL DAILY
Qty: 90 TABLET | Refills: 1 | Status: SHIPPED | OUTPATIENT
Start: 2022-07-27 | End: 2022-11-16 | Stop reason: SDUPTHER

## 2022-07-27 RX ORDER — METFORMIN HYDROCHLORIDE 500 MG/1
500 TABLET ORAL 2 TIMES DAILY WITH MEALS
Qty: 180 TABLET | Refills: 1 | Status: SHIPPED | OUTPATIENT
Start: 2022-07-27 | End: 2022-08-08

## 2022-07-27 RX ORDER — LISINOPRIL AND HYDROCHLOROTHIAZIDE 12.5; 2 MG/1; MG/1
1 TABLET ORAL DAILY
Qty: 90 TABLET | Refills: 1 | Status: SHIPPED | OUTPATIENT
Start: 2022-07-27 | End: 2022-11-16 | Stop reason: SDUPTHER

## 2022-07-27 NOTE — PATIENT INSTRUCTIONS
Take all medications as prescribed, do not stop or start any new medications without first consulting with your pcp or specialist. Make sure are drinking at least 6-8 glasses of water per day.   Be physically active for 30 minutes most days of the week. Break this up into three 10-minute sessions when pressed for time. Healthy movement may include walking, sports, dancing, yoga or running.  Eat a well-balanced, low-fat diet with lots of fruits, vegetables, and whole grains. Choose a diet that's low in saturated fat and cholesterol, and moderate in sugar, salt and total fat.  Avoid injury by wearing seatbelts and bike helmets, using smoke and carbon monoxide detectors in the home, and using street smarts when walking alone. If you own a gun, recognize the dangers of having a gun in your home. Use safety precautions at all times.  Don't smoke, and quit if you do. Ask your health care provider for help. [facility] offers a smoking cessation program.  If you drink alcohol, drink in moderation. Never drink before or when driving.   Ask someone you trust for help if you think you might be addicted to drugs or alcohol.  Help prevent sexually transmitted infections (STIs) and HIV/AIDS by using condoms every time you have sexual contact. Keep in mind, condoms are not 100 percent foolproof, so discuss STI screening with your provider. Birth control methods other than condoms, such as pills and implants, won't protect you from STIs or HIV.  Brush your teeth after meals with a soft or medium bristled toothbrush. Also brush after drinking, before going to bed. Use dental floss daily.  Stay out of the sun, especially between 10 a.m. and 3 p.m. when the sun's harmful rays are strongest. Don't think you are safe if it is cloudy or if you are in the water, as harmful rays pass through both. Use a broad spectrum sunscreen that guards against both UVA and UVB rays, with a sun protection factor (SPF) of 15 or higher. Select sunglasses that  block 99 to 100 percent of the sun's rays.  Learn to recognize and manage stress in your life. Signs of stress include trouble sleeping, frequent headaches and stomach problems; being angry a lot; and turning to food, drugs and alcohol to relieve stress. Good ways to deal with stress include regular exercise, healthy eating habits, and relaxation exercises such as deep breathing or meditation. Talking to trusted family members and friends can help a lot. Some women find that interacting with their torri community is helpful in times of stress. Get enough sleep and rest - adults need around eight hours of sleep a night. Talk to your health care provider if you feel depressed for more than a few days. Depression is a treatable illness. Signs of depression include feeling empty and sad, crying a lot, loss of interest in life, and thoughts of death or suicide. If you or someone you know has thoughts of suicide, get help right away. Call 911, a local crisis center or (508) SUICIDE.

## 2022-07-27 NOTE — PROGRESS NOTES
WADE Means        PATIENT NAME: Alisa Olguin  : 1970  DATE: 22  MRN: [id number]      Billing Provider: WADE Means  Level of Service: [place] PREVENTIVE VISIT,EST,40-64  Patient PCP Information     Provider PCP Type    WADE Means General          Reason for Visit / Chief Complaint: Follow-up (3 month check up and lab work; healthy you) and Medication Refill (Needs all meds refilled except Neurontin )       Update PCP  Update Chief Complaint         History of Present Illness / Problem Focused Workflow      HPI Mrs. Olguin is a 51 y/o WF here for Healthy You, wellness exam and lab work. She requests refills on medication, states she has been out of cholesterol medication for several day.     Review of Systems     Review of Systems   Constitutional: Negative.    HENT: Negative.    Respiratory: Negative.    Cardiovascular: Negative.    Gastrointestinal: Negative.    Genitourinary: Negative.    Musculoskeletal: Positive for leg pain and myalgias.   Neurological: Negative.    Psychiatric/Behavioral: Negative.         Medical / Social / Family History     Past Medical History:   Diagnosis Date    Diabetes mellitus, type 2     Hyperlipidemia     Hypertension        Past Surgical History:   Procedure Laterality Date    TUBAL LIGATION         Social History  Ms. Alisa Olguin  reports that she has never smoked. She has never used smokeless tobacco. She reports previous alcohol use. She reports that she does not use drugs.    Family History  Ms. Alisa Olguin's family history is not on file.    Medications and Allergies     Medications  Outpatient Medications Marked as Taking for the 22 encounter (Office Visit) with WADE Means   Medication Sig Dispense Refill    gabapentin (NEURONTIN) 800 MG tablet Take 1 tablet (800 mg total) by mouth 3 (three) times daily. 270 tablet 1    [DISCONTINUED] lisinopriL-hydrochlorothiazide (PRINZIDE,ZESTORETIC) 20-12.5 mg per  tablet Take 1 tablet by mouth once daily. 90 tablet 1    [DISCONTINUED] metFORMIN (GLUCOPHAGE) 500 MG tablet TAKE 1 & 1 2 (ONE & ONE HALF) TABLETS BY MOUTH IN THE MORNING AND 1 NIGHTLY 225 tablet 1    [DISCONTINUED] rosuvastatin (CRESTOR) 20 MG tablet Take 1 tablet (20 mg total) by mouth once daily. 90 tablet 0       Allergies  Review of patient's allergies indicates:  No Known Allergies    Physical Examination     Vitals:    07/27/22 0931   BP: 130/86   Pulse: 68   Temp: 98.4 °F (36.9 °C)     Physical Exam  Vitals and nursing note reviewed.   Constitutional:       Appearance: Normal appearance. She is obese.   HENT:      Head: Normocephalic and atraumatic.   Cardiovascular:      Rate and Rhythm: Normal rate and regular rhythm.      Pulses: Normal pulses.      Heart sounds: Normal heart sounds.   Pulmonary:      Effort: Pulmonary effort is normal.      Breath sounds: Normal breath sounds.   Abdominal:      General: Bowel sounds are normal.   Genitourinary:     Vagina: No vaginal discharge.   Musculoskeletal:      Cervical back: Normal range of motion and neck supple.      Comments: Peripheral Neuropathy   Skin:     General: Skin is warm and dry.   Neurological:      Mental Status: She is alert and oriented to person, place, and time.   Psychiatric:         Behavior: Behavior normal.       Assessment and Plan (including Health Maintenance)      Problem List  Smart Sets  Document Outside HM   Plan:   Routine general medical examination at a health care facility    Type 2 diabetes mellitus without complication, without long-term current use of insulin  -     Glucose, Fasting; Future; Expected date: 07/27/2022  -     Hemoglobin A1C; Future; Expected date: 07/27/2022  -     metFORMIN (GLUCOPHAGE) 500 MG tablet; Take 1 tablet (500 mg total) by mouth 2 (two) times daily with meals.  Dispense: 180 tablet; Refill: 1    Hyperlipidemia, unspecified hyperlipidemia type  -     Lipid Panel; Future; Expected date: 07/27/2022  -      rosuvastatin (CRESTOR) 20 MG tablet; Take 1 tablet (20 mg total) by mouth once daily.  Dispense: 90 tablet; Refill: 1    BMI 32.0-32.9,adult    Screening for colorectal cancer  -     Ambulatory referral/consult to Gastroenterology; Future; Expected date: 08/03/2022    Screening mammogram, encounter for  -     Mammo Digital Screening Bilat; Future; Expected date: 07/27/2022    Essential hypertension  -     lisinopriL-hydrochlorothiazide (PRINZIDE,ZESTORETIC) 20-12.5 mg per tablet; Take 1 tablet by mouth once daily.  Dispense: 90 tablet; Refill: 1      Health Maintenance Due   Topic Date Due    Hepatitis C Screening  Never done    COVID-19 Vaccine (1) Never done    Pneumococcal Vaccines (Age 0-64) (1 - PCV) Never done    Eye Exam  Never done    HIV Screening  Never done    TETANUS VACCINE  Never done    Mammogram  Never done    Colorectal Cancer Screening  Never done    Shingles Vaccine (1 of 2) Never done    Hemoglobin A1c  07/13/2022       Problem List Items Addressed This Visit    None     Visit Diagnoses     Routine general medical examination at a health care facility    -  Primary    Type 2 diabetes mellitus without complication, without long-term current use of insulin        Relevant Medications    metFORMIN (GLUCOPHAGE) 500 MG tablet    Other Relevant Orders    Glucose, Fasting    Hemoglobin A1C    Hyperlipidemia, unspecified hyperlipidemia type        Relevant Medications    rosuvastatin (CRESTOR) 20 MG tablet    Other Relevant Orders    Lipid Panel    BMI 32.0-32.9,adult        Screening for colorectal cancer        Relevant Orders    Ambulatory referral/consult to Gastroenterology    Screening mammogram, encounter for        Relevant Orders    Mammo Digital Screening Bilat    Essential hypertension        Relevant Medications    lisinopriL-hydrochlorothiazide (PRINZIDE,ZESTORETIC) 20-12.5 mg per tablet          Health Maintenance Topics with due status: Not Due       Topic Last Completion Date     Cervical Cancer Screening 07/12/2021    Influenza Vaccine 12/18/2021    Diabetes Urine Screening 04/13/2022    Foot Exam 04/13/2022    Lipid Panel 04/13/2022    Low Dose Statin 07/27/2022       Future Appointments   Date Time Provider Department Center   7/27/2022  1:15 PM WADE Means [facility]   7/17/2023  8:00 AM Remy Yancey MOISES [facility]        Patient Instructions   Take all medications as prescribed, do not stop or start any new medications without first consulting with your pcp or specialist. Make sure are drinking at least 6-8 glasses of water per day.   Be physically active for 30 minutes most days of the week. Break this up into three 10-minute sessions when pressed for time. Healthy movement may include walking, sports, dancing, yoga or running.  Eat a well-balanced, low-fat diet with lots of fruits, vegetables, and whole grains. Choose a diet that's low in saturated fat and cholesterol, and moderate in sugar, salt and total fat.  Avoid injury by wearing seatbelts and bike helmets, using smoke and carbon monoxide detectors in the home, and using street smarts when walking alone. If you own a gun, recognize the dangers of having a gun in your home. Use safety precautions at all times.  Don't smoke, and quit if you do. Ask your health care provider for help. [facility] offers a smoking cessation program.  If you drink alcohol, drink in moderation. Never drink before or when driving.   Ask someone you trust for help if you think you might be addicted to drugs or alcohol.  Help prevent sexually transmitted infections (STIs) and HIV/AIDS by using condoms every time you have sexual contact. Keep in mind, condoms are not 100 percent foolproof, so discuss STI screening with your provider. Birth control methods other than condoms, such as pills and implants, won't protect you from STIs or HIV.  Brush your teeth after meals with a soft or medium bristled toothbrush. Also  brush after drinking, before going to bed. Use dental floss daily.  Stay out of the sun, especially between 10 a.m. and 3 p.m. when the sun's harmful rays are strongest. Don't think you are safe if it is cloudy or if you are in the water, as harmful rays pass through both. Use a broad spectrum sunscreen that guards against both UVA and UVB rays, with a sun protection factor (SPF) of 15 or higher. Select sunglasses that block 99 to 100 percent of the sun's rays.  Learn to recognize and manage stress in your life. Signs of stress include trouble sleeping, frequent headaches and stomach problems; being angry a lot; and turning to food, drugs and alcohol to relieve stress. Good ways to deal with stress include regular exercise, healthy eating habits, and relaxation exercises such as deep breathing or meditation. Talking to trusted family members and friends can help a lot. Some women find that interacting with their torri community is helpful in times of stress. Get enough sleep and rest - adults need around eight hours of sleep a night. Talk to your health care provider if you feel depressed for more than a few days. Depression is a treatable illness. Signs of depression include feeling empty and sad, crying a lot, loss of interest in life, and thoughts of death or suicide. If you or someone you know has thoughts of suicide, get help right away. Call 911, a local crisis center or (800) SUICIDE.     No follow-ups on file.     Signature:  WADE Means      Date of encounter: 7/27/22

## 2022-07-29 ENCOUNTER — TELEPHONE (OUTPATIENT)
Dept: FAMILY MEDICINE | Facility: CLINIC | Age: 52
End: 2022-07-29
Payer: COMMERCIAL

## 2022-07-29 NOTE — TELEPHONE ENCOUNTER
Discussed with pt;  Will let us know next week if she is remembering to take evening metformin dose.

## 2022-07-29 NOTE — TELEPHONE ENCOUNTER
----- Message from WADE Means sent at 7/28/2022 10:13 AM CDT -----  Please call Alisa and let her know her A1C is higher than it was prior to this lab work. She went from 7.3 to 7.9.  She needs to increase Metformin to 500mg 2 tablets every morning and 1 tablet in the evening. She mentioned she has trouble remembering the evening dose. If she is not going to remember to take the evening dose, we need to change her to something or add another medication she can take just once per day in the morning. She needs to really cut back on the fried foods, whole dairy products and processed meats due to having high Triglycerides. If she is willing to add a fish oil to her regimen this may help. She can take just 1 capsule once per day. Make sure she is drinking plenty of water. No other changes are needed at this time.

## 2022-11-16 ENCOUNTER — OFFICE VISIT (OUTPATIENT)
Dept: FAMILY MEDICINE | Facility: CLINIC | Age: 52
End: 2022-11-16
Payer: COMMERCIAL

## 2022-11-16 VITALS
WEIGHT: 183.38 LBS | TEMPERATURE: 99 F | BODY MASS INDEX: 32.49 KG/M2 | DIASTOLIC BLOOD PRESSURE: 82 MMHG | SYSTOLIC BLOOD PRESSURE: 122 MMHG | HEART RATE: 67 BPM | OXYGEN SATURATION: 98 % | HEIGHT: 63 IN

## 2022-11-16 DIAGNOSIS — G62.9 NEUROPATHY: ICD-10-CM

## 2022-11-16 DIAGNOSIS — Z11.59 ENCOUNTER FOR HEPATITIS C SCREENING TEST FOR LOW RISK PATIENT: ICD-10-CM

## 2022-11-16 DIAGNOSIS — E78.5 HYPERLIPIDEMIA, UNSPECIFIED HYPERLIPIDEMIA TYPE: ICD-10-CM

## 2022-11-16 DIAGNOSIS — Z11.4 ENCOUNTER FOR SCREENING FOR HIV: ICD-10-CM

## 2022-11-16 DIAGNOSIS — I10 ESSENTIAL HYPERTENSION: Primary | ICD-10-CM

## 2022-11-16 DIAGNOSIS — S89.91XA INJURY OF RIGHT KNEE, INITIAL ENCOUNTER: ICD-10-CM

## 2022-11-16 DIAGNOSIS — D51.9 ANEMIA DUE TO VITAMIN B12 DEFICIENCY, UNSPECIFIED B12 DEFICIENCY TYPE: ICD-10-CM

## 2022-11-16 DIAGNOSIS — E11.9 TYPE 2 DIABETES MELLITUS WITHOUT COMPLICATION, WITHOUT LONG-TERM CURRENT USE OF INSULIN: ICD-10-CM

## 2022-11-16 LAB
EST. AVERAGE GLUCOSE BLD GHB EST-MCNC: 197 MG/DL
HBA1C MFR BLD HPLC: 8.5 % (ref 4.5–6.6)
HCV AB SER QL: NORMAL
HIV 1+O+2 AB SERPL QL: NORMAL

## 2022-11-16 PROCEDURE — 3008F PR BODY MASS INDEX (BMI) DOCUMENTED: ICD-10-PCS | Mod: ,,, | Performed by: REGISTERED NURSE

## 2022-11-16 PROCEDURE — 1160F RVW MEDS BY RX/DR IN RCRD: CPT | Mod: ,,, | Performed by: REGISTERED NURSE

## 2022-11-16 PROCEDURE — 4010F PR ACE/ARB THEARPY RXD/TAKEN: ICD-10-PCS | Mod: ,,, | Performed by: REGISTERED NURSE

## 2022-11-16 PROCEDURE — 87389 HIV 1 / 2 ANTIBODY: ICD-10-PCS | Mod: ,,, | Performed by: CLINICAL MEDICAL LABORATORY

## 2022-11-16 PROCEDURE — 1160F PR REVIEW ALL MEDS BY PRESCRIBER/CLIN PHARMACIST DOCUMENTED: ICD-10-PCS | Mod: ,,, | Performed by: REGISTERED NURSE

## 2022-11-16 PROCEDURE — 99214 PR OFFICE/OUTPT VISIT, EST, LEVL IV, 30-39 MIN: ICD-10-PCS | Mod: 25,,, | Performed by: REGISTERED NURSE

## 2022-11-16 PROCEDURE — 1159F PR MEDICATION LIST DOCUMENTED IN MEDICAL RECORD: ICD-10-PCS | Mod: ,,, | Performed by: REGISTERED NURSE

## 2022-11-16 PROCEDURE — 3066F PR DOCUMENTATION OF TREATMENT FOR NEPHROPATHY: ICD-10-PCS | Mod: ,,, | Performed by: REGISTERED NURSE

## 2022-11-16 PROCEDURE — 96372 PR INJECTION,THERAP/PROPH/DIAG2ST, IM OR SUBCUT: ICD-10-PCS | Mod: ,,, | Performed by: REGISTERED NURSE

## 2022-11-16 PROCEDURE — 3074F SYST BP LT 130 MM HG: CPT | Mod: ,,, | Performed by: REGISTERED NURSE

## 2022-11-16 PROCEDURE — 3052F HG A1C>EQUAL 8.0%<EQUAL 9.0%: CPT | Mod: ,,, | Performed by: REGISTERED NURSE

## 2022-11-16 PROCEDURE — 3066F NEPHROPATHY DOC TX: CPT | Mod: ,,, | Performed by: REGISTERED NURSE

## 2022-11-16 PROCEDURE — 3061F PR NEG MICROALBUMINURIA RESULT DOCUMENTED/REVIEW: ICD-10-PCS | Mod: ,,, | Performed by: REGISTERED NURSE

## 2022-11-16 PROCEDURE — 1159F MED LIST DOCD IN RCRD: CPT | Mod: ,,, | Performed by: REGISTERED NURSE

## 2022-11-16 PROCEDURE — 3061F NEG MICROALBUMINURIA REV: CPT | Mod: ,,, | Performed by: REGISTERED NURSE

## 2022-11-16 PROCEDURE — 3008F BODY MASS INDEX DOCD: CPT | Mod: ,,, | Performed by: REGISTERED NURSE

## 2022-11-16 PROCEDURE — 99214 OFFICE O/P EST MOD 30 MIN: CPT | Mod: 25,,, | Performed by: REGISTERED NURSE

## 2022-11-16 PROCEDURE — 4010F ACE/ARB THERAPY RXD/TAKEN: CPT | Mod: ,,, | Performed by: REGISTERED NURSE

## 2022-11-16 PROCEDURE — 83036 HEMOGLOBIN A1C: ICD-10-PCS | Mod: ,,, | Performed by: CLINICAL MEDICAL LABORATORY

## 2022-11-16 PROCEDURE — 86803 HEPATITIS C ANTIBODY: ICD-10-PCS | Mod: ,,, | Performed by: CLINICAL MEDICAL LABORATORY

## 2022-11-16 PROCEDURE — 87389 HIV-1 AG W/HIV-1&-2 AB AG IA: CPT | Mod: ,,, | Performed by: CLINICAL MEDICAL LABORATORY

## 2022-11-16 PROCEDURE — 3079F PR MOST RECENT DIASTOLIC BLOOD PRESSURE 80-89 MM HG: ICD-10-PCS | Mod: ,,, | Performed by: REGISTERED NURSE

## 2022-11-16 PROCEDURE — 3052F PR MOST RECENT HEMOGLOBIN A1C LEVEL 8.0 - < 9.0%: ICD-10-PCS | Mod: ,,, | Performed by: REGISTERED NURSE

## 2022-11-16 PROCEDURE — 96372 THER/PROPH/DIAG INJ SC/IM: CPT | Mod: ,,, | Performed by: REGISTERED NURSE

## 2022-11-16 PROCEDURE — 83036 HEMOGLOBIN GLYCOSYLATED A1C: CPT | Mod: ,,, | Performed by: CLINICAL MEDICAL LABORATORY

## 2022-11-16 PROCEDURE — 3074F PR MOST RECENT SYSTOLIC BLOOD PRESSURE < 130 MM HG: ICD-10-PCS | Mod: ,,, | Performed by: REGISTERED NURSE

## 2022-11-16 PROCEDURE — 86803 HEPATITIS C AB TEST: CPT | Mod: ,,, | Performed by: CLINICAL MEDICAL LABORATORY

## 2022-11-16 PROCEDURE — 3079F DIAST BP 80-89 MM HG: CPT | Mod: ,,, | Performed by: REGISTERED NURSE

## 2022-11-16 RX ORDER — CYANOCOBALAMIN 1000 UG/ML
1000 INJECTION, SOLUTION INTRAMUSCULAR; SUBCUTANEOUS
Status: COMPLETED | OUTPATIENT
Start: 2022-11-16 | End: 2022-11-16

## 2022-11-16 RX ORDER — METFORMIN HYDROCHLORIDE 500 MG/1
TABLET ORAL
Qty: 225 TABLET | Refills: 0 | Status: SHIPPED | OUTPATIENT
Start: 2022-11-16 | End: 2023-03-31 | Stop reason: SDUPTHER

## 2022-11-16 RX ORDER — KETOROLAC TROMETHAMINE 30 MG/ML
30 INJECTION, SOLUTION INTRAMUSCULAR; INTRAVENOUS
Status: COMPLETED | OUTPATIENT
Start: 2022-11-16 | End: 2022-11-16

## 2022-11-16 RX ORDER — ROSUVASTATIN CALCIUM 20 MG/1
20 TABLET, COATED ORAL DAILY
Qty: 90 TABLET | Refills: 1 | Status: SHIPPED | OUTPATIENT
Start: 2022-11-16 | End: 2023-07-17 | Stop reason: SDUPTHER

## 2022-11-16 RX ORDER — LISINOPRIL AND HYDROCHLOROTHIAZIDE 12.5; 2 MG/1; MG/1
1 TABLET ORAL DAILY
Qty: 90 TABLET | Refills: 1 | Status: SHIPPED | OUTPATIENT
Start: 2022-11-16 | End: 2023-07-17 | Stop reason: SDUPTHER

## 2022-11-16 RX ORDER — GABAPENTIN 800 MG/1
800 TABLET ORAL 3 TIMES DAILY
Qty: 270 TABLET | Refills: 1 | Status: SHIPPED | OUTPATIENT
Start: 2022-11-16 | End: 2023-07-17 | Stop reason: SDUPTHER

## 2022-11-16 RX ADMIN — KETOROLAC TROMETHAMINE 30 MG: 30 INJECTION, SOLUTION INTRAMUSCULAR; INTRAVENOUS at 11:11

## 2022-11-16 RX ADMIN — CYANOCOBALAMIN 1000 MCG: 1000 INJECTION, SOLUTION INTRAMUSCULAR; SUBCUTANEOUS at 11:11

## 2022-11-16 NOTE — PATIENT INSTRUCTIONS
Return for next appointment in 3-6 months, sooner if needed. Take all medications as prescribed. Do not stop or start any new medications without consulting with your provider. If you have access to blood pressure monitor at home, may check blood pressure as needed if symptoms of high or low blood pressure evident.      We will call you with lab results when results available.     Start B12 sublingual vitamins, 1 tablet every day for vitamin B12 deficiency.  Start Vitamin D tablets, take 1 tablet once per day for vitamin d levels.

## 2022-11-16 NOTE — PROGRESS NOTES
"   WADE Means        PATIENT NAME: Alisa Olguin  : 1970  DATE: 22  MRN: [id number]      Billing Provider: WADE Means  Level of Service: [place] OFFICE/OUTPT VISIT, EST, LEVL IV, 30-39 MIN  Patient PCP Information       Provider PCP Type    WADE Means General            Reason for Visit / Chief Complaint: Medication Refill       Update PCP  Update Chief Complaint         History of Present Illness / Problem Focused Workflow     HPI Ms. Olguin is a 53 y/o WF here for chronic disease follow-up and medication refills. She reports recent onset of right knee pain reporting possible injury or trauma from "hitting knee" on concrete stairs. Fall occurred several days prior, she reports it has been about 2 weeks since incident. She indicates she was walking up the stairs at work and slipped, hitting her knee on the corner of the stair/step. No prior diagnostic tests, injury occurred recently, pain has gotten progressively worse in past 1-2 days. Tenderness over patella and medial side of right knee with palpation. Minor swelling, no redness. Pain does not radiate. No pain behind knee. She is able to bear weight, reports knee feels unstable and weak at times. Ms. Olguin reports having increased fatigue and daytime drowsiness. She denies known sleep pattern disturbance but admits she has been told she snores nightly. No recent change in diet or fluid intake.     Review of Systems     Review of Systems   Constitutional:  Positive for activity change and fatigue.   HENT: Negative.     Respiratory: Negative.     Cardiovascular: Negative.    Gastrointestinal: Negative.    Genitourinary: Negative.    Musculoskeletal:  Positive for arthralgias, gait problem, joint swelling, leg pain and myalgias.   Neurological:  Positive for weakness.      Medical / Social / Family History     Past Medical History:   Diagnosis Date    Diabetes mellitus, type 2     Hyperlipidemia     Hypertension        Past " Surgical History:   Procedure Laterality Date    TUBAL LIGATION         Social History  Ms. Alisa Olguin  reports that she has never smoked. She has never used smokeless tobacco. She reports that she does not currently use alcohol. She reports that she does not use drugs.    Family History  Ms. Alisa Olguin's family history is not on file.    Medications and Allergies     Medications  Outpatient Medications Marked as Taking for the 11/16/22 encounter (Office Visit) with WADE Means   Medication Sig Dispense Refill    [DISCONTINUED] lisinopriL-hydrochlorothiazide (PRINZIDE,ZESTORETIC) 20-12.5 mg per tablet Take 1 tablet by mouth once daily. 90 tablet 1    [DISCONTINUED] metFORMIN (GLUCOPHAGE) 500 MG tablet TAKE 1 & 1/2 (ONE & ONE-HALF) TABLETS BY MOUTH IN THE MORNING AND 1 AT NIGHT 225 tablet 0    [DISCONTINUED] rosuvastatin (CRESTOR) 20 MG tablet Take 1 tablet (20 mg total) by mouth once daily. 90 tablet 1     Allergies  Review of patient's allergies indicates:  No Known Allergies    Physical Examination     Vitals:    11/16/22 1105   BP: 122/82   Pulse:    Temp:      Physical Exam  Vitals and nursing note reviewed.   Constitutional:       Appearance: She is obese.   HENT:      Head: Normocephalic and atraumatic.   Cardiovascular:      Rate and Rhythm: Normal rate and regular rhythm.      Heart sounds: Normal heart sounds.   Pulmonary:      Effort: Pulmonary effort is normal.      Breath sounds: Normal breath sounds.   Musculoskeletal:      Cervical back: Normal range of motion.      Right knee: Swelling and bony tenderness present. Decreased range of motion. Tenderness present over the patellar tendon.   Skin:     General: Skin is warm and dry.   Neurological:      Mental Status: She is alert and oriented to person, place, and time.        Assessment and Plan (including Health Maintenance)      Problem List  Smart Sets  Document Outside HM   Plan:   Essential hypertension  -      lisinopriL-hydrochlorothiazide (PRINZIDE,ZESTORETIC) 20-12.5 mg per tablet; Take 1 tablet by mouth once daily.  Dispense: 90 tablet; Refill: 1    Neuropathy  -     gabapentin (NEURONTIN) 800 MG tablet; Take 1 tablet (800 mg total) by mouth 3 (three) times daily.  Dispense: 270 tablet; Refill: 1    Type 2 diabetes mellitus without complication, without long-term current use of insulin  -     metFORMIN (GLUCOPHAGE) 500 MG tablet; TAKE 1 & 1/2 (ONE & ONE-HALF) TABLETS BY MOUTH IN THE MORNING AND 1 AT NIGHT  Dispense: 225 tablet; Refill: 0  -     Hemoglobin A1C; Future; Expected date: 11/16/2022    Hyperlipidemia, unspecified hyperlipidemia type  -     rosuvastatin (CRESTOR) 20 MG tablet; Take 1 tablet (20 mg total) by mouth once daily.  Dispense: 90 tablet; Refill: 1    Encounter for hepatitis C screening test for low risk patient  -     Hepatitis C Antibody; Future; Expected date: 11/16/2022    Injury of right knee, initial encounter  -     X-Ray Knee 3 View Right; Future; Expected date: 11/16/2022  -     ketorolac injection 30 mg  -     Ambulatory referral/consult to Orthopedics; Future; Expected date: 11/29/2022    Encounter for screening for HIV  -     HIV 1/2 Ag/Ab (4th Gen); Future; Expected date: 11/16/2022    Anemia due to vitamin B12 deficiency, unspecified B12 deficiency type  -     cyanocobalamin injection 1,000 mcg     Will review lab work and treat accordingly. Will review x-ray results and refer to Orthopedics if indicated. Patient may qualify for home sleep study, will refer to [facility] for home sleep study if patient agrees.     Health Maintenance Due   Topic Date Due    Pneumococcal Vaccines (Age 0-64) (1 - PCV) Never done    Eye Exam  Never done    TETANUS VACCINE  Never done    Colorectal Cancer Screening  Never done       Problem List Items Addressed This Visit    None  Visit Diagnoses       Essential hypertension    -  Primary    Relevant Medications    lisinopriL-hydrochlorothiazide  (PRINZIDE,ZESTORETIC) 20-12.5 mg per tablet    Neuropathy        Relevant Medications    gabapentin (NEURONTIN) 800 MG tablet    Type 2 diabetes mellitus without complication, without long-term current use of insulin        Relevant Medications    metFORMIN (GLUCOPHAGE) 500 MG tablet    Other Relevant Orders    Hemoglobin A1C (Completed)    Hyperlipidemia, unspecified hyperlipidemia type        Relevant Medications    rosuvastatin (CRESTOR) 20 MG tablet    Encounter for hepatitis C screening test for low risk patient        Relevant Orders    Hepatitis C Antibody (Completed)    Injury of right knee, initial encounter        Relevant Medications    ketorolac injection 30 mg (Completed)    Other Relevant Orders    X-Ray Knee 3 View Right (Completed)    Ambulatory referral/consult to Orthopedics    Encounter for screening for HIV        Relevant Orders    HIV 1/2 Ag/Ab (4th Gen) (Completed)    Anemia due to vitamin B12 deficiency, unspecified B12 deficiency type        Relevant Medications    cyanocobalamin injection 1,000 mcg (Completed)          Health Maintenance Topics with due status: Not Due       Topic Last Completion Date    Cervical Cancer Screening 07/12/2021    Diabetes Urine Screening 04/13/2022    Foot Exam 04/13/2022    Lipid Panel 07/27/2022    Mammogram 08/19/2022    Low Dose Statin 11/16/2022    Hemoglobin A1c 11/16/2022     Future Appointments   Date Time Provider Department Center   7/17/2023  8:00 AM WADE Means [facility]      Patient Instructions   Return for next appointment in 3-6 months, sooner if needed. Take all medications as prescribed. Do not stop or start any new medications without consulting with your provider. If you have access to blood pressure monitor at home, may check blood pressure as needed if symptoms of high or low blood pressure evident.      We will call you with lab results when results available.     Start B12 sublingual vitamins, 1 tablet every day  for vitamin B12 deficiency.  Start Vitamin D tablets, take 1 tablet once per day for vitamin d levels.   Follow up in about 5 months (around 4/16/2023).     Signature:  WADE Means    Date of encounter: 11/16/22       Clothing

## 2022-11-16 NOTE — LETTER
November 16, 2022      [facility]  [address] [place] [address] [place] [address]  Phone: [phone]  Fax: [phone]       Patient: Alisa Olguin   YOB: 1970  Date of Visit: 11/16/2022    To Whom It May Concern:    Emigdio Olguin  was at [facility] on 11/16/2022. The patient may return to work on 11/17/2022 with no restrictions. If you have any questions or concerns, or if I can be of further assistance, please do not hesitate to contact me.    Sincerely,        WADE Means

## 2022-11-28 ENCOUNTER — OFFICE VISIT (OUTPATIENT)
Dept: FAMILY MEDICINE | Facility: CLINIC | Age: 52
End: 2022-11-28
Payer: OTHER MISCELLANEOUS

## 2022-11-28 VITALS
TEMPERATURE: 99 F | HEART RATE: 80 BPM | RESPIRATION RATE: 18 BRPM | DIASTOLIC BLOOD PRESSURE: 88 MMHG | OXYGEN SATURATION: 98 % | SYSTOLIC BLOOD PRESSURE: 127 MMHG | BODY MASS INDEX: 32.28 KG/M2 | WEIGHT: 182.19 LBS | HEIGHT: 63 IN

## 2022-11-28 DIAGNOSIS — Z76.89 RETURN TO WORK EVALUATION: Primary | ICD-10-CM

## 2022-11-28 PROCEDURE — 99213 OFFICE O/P EST LOW 20 MIN: CPT | Mod: ,,, | Performed by: REGISTERED NURSE

## 2022-11-28 PROCEDURE — 99213 PR OFFICE/OUTPT VISIT, EST, LEVL III, 20-29 MIN: ICD-10-PCS | Mod: ,,, | Performed by: REGISTERED NURSE

## 2022-11-28 NOTE — PROGRESS NOTES
WADE Means        PATIENT NAME: Alisa Olguin  : 1970  DATE: 22  MRN: [id number]      Billing Provider: WADE Means  Level of Service: DE OFFICE/OUTPT VISIT, EST, LEVL III, 20-29 MIN  Patient PCP Information       Provider PCP Type    WADE Means General            Reason for Visit / Chief Complaint: Fall (Paper work from fall at work on  or )       Update PCP  Update Chief Complaint         History of Present Illness / Problem Focused Workflow     HPI Mrs. 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 Paperwork pertains to recent visit on 2022 when patient presented for routine follow-up and medication refills. While present she also reported a fall sustained at work which resulted in right knee pain. Fall had occurred several days prior to last appointment. Patient had some decreased range of motion and stated then pain was worse when standing for long periods of time. She stated then she had not missed any days of work and had not discussed this with Human Resources. An x-ray was done and patient was given Toradol injection for acute inflammation and pain in right knee. She was offered 1-2 days off work but declined offer stating she had been working since incident and would be okay.  X-ray showed presence of bone spur in right knee, Mrs. Olguin was referred to Orthopedics for follow-up. No acute fracture or misalignment was seen on x-ray. Today, she has paperwork to be signed showing she can return to full-duty without restrictions. Asked if a repeat x-ray is needed, she replies no, she does not think so. She indicates she is uncertain if paperwork is related to 2022 visit or if paperwork is for today's visit. Acute pain and swelling have resolved, she denies further complaints related to right knee. She reports having an Orthopedic appointment on 2022 but states she cancelled it due to  having appointment here today.    Review of Systems     Review of Systems   Constitutional: Negative.    Respiratory: Negative.     Cardiovascular: Negative.    Gastrointestinal: Negative.    Genitourinary: Negative.    Musculoskeletal:  Positive for arthralgias and myalgias.   Neurological: Negative.       Medical / Social / Family History     Past Medical History:   Diagnosis Date    Diabetes mellitus, type 2     Hyperlipidemia     Hypertension      Past Surgical History:   Procedure Laterality Date    TUBAL LIGATION       Social History  Ms. Alisa Olguin  reports that she has never smoked. She has never used smokeless tobacco. She reports that she does not currently use alcohol. She reports that she does not use drugs.    Family History  Ms. Alisa Olguin's family history is not on file.    Medications and Allergies     Medications  Outpatient Medications Marked as Taking for the 11/28/22 encounter (Office Visit) with WADE Means   Medication Sig Dispense Refill    gabapentin (NEURONTIN) 800 MG tablet Take 1 tablet (800 mg total) by mouth 3 (three) times daily. 270 tablet 1    lisinopriL-hydrochlorothiazide (PRINZIDE,ZESTORETIC) 20-12.5 mg per tablet Take 1 tablet by mouth once daily. 90 tablet 1    metFORMIN (GLUCOPHAGE) 500 MG tablet TAKE 1 & 1/2 (ONE & ONE-HALF) TABLETS BY MOUTH IN THE MORNING AND 1 AT NIGHT 225 tablet 0    rosuvastatin (CRESTOR) 20 MG tablet Take 1 tablet (20 mg total) by mouth once daily. 90 tablet 1     Allergies  Review of patient's allergies indicates:  No Known Allergies    Physical Examination     Vitals:    11/28/22 1115   BP: 127/88   Pulse: 80   Resp: 18   Temp: 98.6 °F (37 °C)     Physical Exam  Vitals and nursing note reviewed.   Constitutional:       Appearance: Normal appearance. She is obese.   HENT:      Head: Normocephalic and atraumatic.   Cardiovascular:      Rate and Rhythm: Normal rate and regular rhythm.      Heart sounds: Normal heart sounds.   Pulmonary:       Effort: Pulmonary effort is normal.      Breath sounds: Normal breath sounds.   Musculoskeletal:         General: Normal range of motion.      Cervical back: Normal range of motion.   Skin:     General: Skin is warm and dry.   Neurological:      Mental Status: She is alert and oriented to person, place, and time.     Assessment and Plan (including Health Maintenance)      Problem List  Smart Sets  Document Outside    Plan:   Return to work evaluation     Encouraged to call and reschedule Orthopedic appointment if she feels need for further evaluation. Explained bone spurs are typically the result of arthritic changes in the bone and joint. It is no uncommon to find a bone spur, most people do not know they have them unless the bone spur is putting pressure on a nerve or involved in damage to tendons or cartilage.     Health Maintenance Due   Topic Date Due    Pneumococcal Vaccines (Age 0-64) (1 - PCV) Never done    Eye Exam  Never done    TETANUS VACCINE  Never done    Colorectal Cancer Screening  Never done     Problem List Items Addressed This Visit    None  Visit Diagnoses       Return to work evaluation    -  Primary          Health Maintenance Topics with due status: Not Due       Topic Last Completion Date    Cervical Cancer Screening 07/12/2021    Diabetes Urine Screening 04/13/2022    Foot Exam 04/13/2022    Lipid Panel 07/27/2022    Mammogram 08/19/2022    Hemoglobin A1c 11/16/2022    Low Dose Statin 11/28/2022     Future Appointments   Date Time Provider Department Center   7/17/2023  8:00 AM WADE Means [facility]      Patient Instructions   Paperwork for Human Resources completed as indicated. Made clear in paperwork, original appointment addressing knee pain was done 11/16/2022. Condition has since resolved, she has no further complaints. Encouraged to follow-up with Orthopedics for any ongoing questions or concerns.   Follow up if symptoms worsen or fail to improve.      Signature:  WADE Means      Date of encounter: 11/28/22

## 2022-11-28 NOTE — PATIENT INSTRUCTIONS
Paperwork for Human Resources completed as indicated. Made clear in paperwork, original appointment addressing knee pain was done 11/16/2022. Condition has since resolved, she has no further complaints. Encouraged to follow-up with Orthopedics for any ongoing questions or concerns.

## 2023-03-31 ENCOUNTER — OFFICE VISIT (OUTPATIENT)
Dept: FAMILY MEDICINE | Facility: CLINIC | Age: 53
End: 2023-03-31
Payer: COMMERCIAL

## 2023-03-31 VITALS
HEIGHT: 63 IN | DIASTOLIC BLOOD PRESSURE: 76 MMHG | SYSTOLIC BLOOD PRESSURE: 124 MMHG | OXYGEN SATURATION: 95 % | TEMPERATURE: 98 F | RESPIRATION RATE: 18 BRPM | WEIGHT: 184 LBS | BODY MASS INDEX: 32.6 KG/M2 | HEART RATE: 77 BPM

## 2023-03-31 DIAGNOSIS — E11.9 TYPE 2 DIABETES MELLITUS WITHOUT COMPLICATION, WITHOUT LONG-TERM CURRENT USE OF INSULIN: Primary | ICD-10-CM

## 2023-03-31 LAB
BILIRUB UR QL STRIP: NEGATIVE
CLARITY UR: CLEAR
COLOR UR: ABNORMAL
CREAT UR-MCNC: 64 MG/DL (ref 28–219)
EST. AVERAGE GLUCOSE BLD GHB EST-MCNC: 257 MG/DL
GLUCOSE UR STRIP-MCNC: >1000 MG/DL
HBA1C MFR BLD HPLC: 10.3 % (ref 4.5–6.6)
KETONES UR STRIP-SCNC: NEGATIVE MG/DL
LEUKOCYTE ESTERASE UR QL STRIP: ABNORMAL
MICROALBUMIN UR-MCNC: 0.7 MG/DL (ref 0–2.8)
MICROALBUMIN/CREAT RATIO PNL UR: 10.9 MG/G (ref 0–30)
MUCOUS, UA: ABNORMAL /LPF
NITRITE UR QL STRIP: NEGATIVE
PH UR STRIP: 6 PH UNITS
PROT UR QL STRIP: NEGATIVE
RBC # UR STRIP: NEGATIVE /UL
RBC #/AREA URNS HPF: 2 /HPF
SP GR UR STRIP: 1.03
SQUAMOUS #/AREA URNS LPF: ABNORMAL /HPF
UROBILINOGEN UR STRIP-ACNC: 4 MG/DL
WBC #/AREA URNS HPF: 10 /HPF

## 2023-03-31 PROCEDURE — 99213 OFFICE O/P EST LOW 20 MIN: CPT | Mod: ,,, | Performed by: NURSE PRACTITIONER

## 2023-03-31 PROCEDURE — 82043 MICROALBUMIN / CREATININE RATIO URINE: ICD-10-PCS | Mod: ,,, | Performed by: CLINICAL MEDICAL LABORATORY

## 2023-03-31 PROCEDURE — 1160F RVW MEDS BY RX/DR IN RCRD: CPT | Mod: ,,, | Performed by: NURSE PRACTITIONER

## 2023-03-31 PROCEDURE — 4010F ACE/ARB THERAPY RXD/TAKEN: CPT | Mod: ,,, | Performed by: NURSE PRACTITIONER

## 2023-03-31 PROCEDURE — 3078F DIAST BP <80 MM HG: CPT | Mod: ,,, | Performed by: NURSE PRACTITIONER

## 2023-03-31 PROCEDURE — 3074F PR MOST RECENT SYSTOLIC BLOOD PRESSURE < 130 MM HG: ICD-10-PCS | Mod: ,,, | Performed by: NURSE PRACTITIONER

## 2023-03-31 PROCEDURE — 3078F PR MOST RECENT DIASTOLIC BLOOD PRESSURE < 80 MM HG: ICD-10-PCS | Mod: ,,, | Performed by: NURSE PRACTITIONER

## 2023-03-31 PROCEDURE — 3074F SYST BP LT 130 MM HG: CPT | Mod: ,,, | Performed by: NURSE PRACTITIONER

## 2023-03-31 PROCEDURE — 1160F PR REVIEW ALL MEDS BY PRESCRIBER/CLIN PHARMACIST DOCUMENTED: ICD-10-PCS | Mod: ,,, | Performed by: NURSE PRACTITIONER

## 2023-03-31 PROCEDURE — 82570 ASSAY OF URINE CREATININE: CPT | Mod: ,,, | Performed by: CLINICAL MEDICAL LABORATORY

## 2023-03-31 PROCEDURE — 82043 UR ALBUMIN QUANTITATIVE: CPT | Mod: ,,, | Performed by: CLINICAL MEDICAL LABORATORY

## 2023-03-31 PROCEDURE — 99213 PR OFFICE/OUTPT VISIT, EST, LEVL III, 20-29 MIN: ICD-10-PCS | Mod: ,,, | Performed by: NURSE PRACTITIONER

## 2023-03-31 PROCEDURE — 1159F PR MEDICATION LIST DOCUMENTED IN MEDICAL RECORD: ICD-10-PCS | Mod: ,,, | Performed by: NURSE PRACTITIONER

## 2023-03-31 PROCEDURE — 81001 URINALYSIS AUTO W/SCOPE: CPT | Mod: ,,, | Performed by: CLINICAL MEDICAL LABORATORY

## 2023-03-31 PROCEDURE — 82570 MICROALBUMIN / CREATININE RATIO URINE: ICD-10-PCS | Mod: ,,, | Performed by: CLINICAL MEDICAL LABORATORY

## 2023-03-31 PROCEDURE — 3008F PR BODY MASS INDEX (BMI) DOCUMENTED: ICD-10-PCS | Mod: ,,, | Performed by: NURSE PRACTITIONER

## 2023-03-31 PROCEDURE — 83036 HEMOGLOBIN A1C: ICD-10-PCS | Mod: ,,, | Performed by: CLINICAL MEDICAL LABORATORY

## 2023-03-31 PROCEDURE — 3008F BODY MASS INDEX DOCD: CPT | Mod: ,,, | Performed by: NURSE PRACTITIONER

## 2023-03-31 PROCEDURE — 83036 HEMOGLOBIN GLYCOSYLATED A1C: CPT | Mod: ,,, | Performed by: CLINICAL MEDICAL LABORATORY

## 2023-03-31 PROCEDURE — 1159F MED LIST DOCD IN RCRD: CPT | Mod: ,,, | Performed by: NURSE PRACTITIONER

## 2023-03-31 PROCEDURE — 81001 URINALYSIS: ICD-10-PCS | Mod: ,,, | Performed by: CLINICAL MEDICAL LABORATORY

## 2023-03-31 PROCEDURE — 4010F PR ACE/ARB THEARPY RXD/TAKEN: ICD-10-PCS | Mod: ,,, | Performed by: NURSE PRACTITIONER

## 2023-03-31 RX ORDER — METFORMIN HYDROCHLORIDE 500 MG/1
TABLET ORAL
Qty: 270 TABLET | Refills: 0 | Status: SHIPPED | OUTPATIENT
Start: 2023-03-31 | End: 2023-06-19

## 2023-03-31 NOTE — PATIENT INSTRUCTIONS
We will call you with your new A1C result and let you know if you need to take your medicine differently. For now, take one and a half in the morning and one and a half at night.  If we need to discuss or look into a weekly injection, then we will revisit that according to your new A1C and see if we can find something affordable for you.

## 2023-03-31 NOTE — PROGRESS NOTES
WADE Cid   [place] [address] [facility] [place]  [address]     PATIENT NAME: Alisa Olgiun  : 1970  DATE: 3/31/23  MRN: [id number]      Billing Provider: WADE Cid  Level of Service: OK OFFICE/OUTPT VISIT, EST, LEVL III, 20-29 MIN  Patient PCP Information       Provider PCP Type    WADE Means General            Reason for Visit / Chief Complaint: Medication Refill       Update PCP  Update Chief Complaint         History of Present Illness / Problem Focused Workflow     Alisa Olguin presents to the clinic with Medication Refill     53 y/o  female presents for med refill of metformin. She has been out for 2 weeks. She was told on her last visit that she may end up needing a once a week shot, but they were sure if her insurance would cover it and she never heard back. It appears that she is overdue for her A1C so will draw labs today and readdress injection if it is necessary. No complaints today.     Medication Refill      Review of Systems     Review of Systems   Constitutional: Negative.    HENT: Negative.     Eyes: Negative.    Respiratory: Negative.     Cardiovascular: Negative.    Gastrointestinal: Negative.    Endocrine: Negative.    Musculoskeletal: Negative.    Integumentary:  Negative.   Neurological: Negative.    Psychiatric/Behavioral: Negative.     All other systems reviewed and are negative.     Medical / Social / Family History     Past Medical History:   Diagnosis Date    Diabetes mellitus, type 2     Hyperlipidemia     Hypertension        Past Surgical History:   Procedure Laterality Date    TUBAL LIGATION         Social History  Ms. Olguin  reports that she has never smoked. She has never used smokeless tobacco. She reports that she does not currently use alcohol. She reports that she does not use drugs.    Family History  Ms.'s Olguin family history is not on file.    Medications and Allergies     Medications  Outpatient  Medications Marked as Taking for the 3/31/23 encounter (Office Visit) with WADE Cid   Medication Sig Dispense Refill    gabapentin (NEURONTIN) 800 MG tablet Take 1 tablet (800 mg total) by mouth 3 (three) times daily. 270 tablet 1    lisinopriL-hydrochlorothiazide (PRINZIDE,ZESTORETIC) 20-12.5 mg per tablet Take 1 tablet by mouth once daily. 90 tablet 1    rosuvastatin (CRESTOR) 20 MG tablet Take 1 tablet (20 mg total) by mouth once daily. 90 tablet 1    [DISCONTINUED] metFORMIN (GLUCOPHAGE) 500 MG tablet TAKE 1 & 1/2 (ONE & ONE-HALF) TABLETS BY MOUTH IN THE MORNING AND 1 AT NIGHT 225 tablet 0       Allergies  Review of patient's allergies indicates:  No Known Allergies    Physical Examination     Vitals:    03/31/23 1531   BP: 124/76   Pulse: 77   Resp: 18   Temp: 98.3 °F (36.8 °C)     Physical Exam  Vitals and nursing note reviewed.   Constitutional:       Appearance: Normal appearance. She is normal weight.   HENT:      Head: Normocephalic and atraumatic.      Mouth/Throat:      Mouth: Mucous membranes are moist.      Pharynx: Oropharynx is clear.   Eyes:      Extraocular Movements: Extraocular movements intact.      Conjunctiva/sclera: Conjunctivae normal.      Pupils: Pupils are equal, round, and reactive to light.   Cardiovascular:      Rate and Rhythm: Normal rate and regular rhythm.      Pulses: Normal pulses.      Heart sounds: Normal heart sounds.   Pulmonary:      Effort: Pulmonary effort is normal.      Breath sounds: Normal breath sounds.   Abdominal:      General: Abdomen is flat. Bowel sounds are normal.      Palpations: Abdomen is soft.   Musculoskeletal:         General: Normal range of motion.      Cervical back: Normal range of motion and neck supple.   Skin:     General: Skin is warm and dry.      Capillary Refill: Capillary refill takes less than 2 seconds.   Neurological:      General: No focal deficit present.      Mental Status: She is alert and oriented to person, place, and  time. Mental status is at baseline.   Psychiatric:         Mood and Affect: Mood normal.         Behavior: Behavior normal.         Thought Content: Thought content normal.         Judgment: Judgment normal.            Assessment and Plan (including Health Maintenance)      Problem List  Smart Sets  Document Outside HM   :    Plan:   Type 2 diabetes mellitus without complication, without long-term current use of insulin  -     Hemoglobin A1C; Future; Expected date: 03/31/2023  -     Microalbumin/Creatinine Ratio, Urine; Future; Expected date: 03/31/2023  -     Urinalysis; Future; Expected date: 03/31/2023  -     metFORMIN (GLUCOPHAGE) 500 MG tablet; TAKE 1 & 1/2 (ONE & ONE-HALF) TABLETS BY MOUTH IN THE MORNING AND 1 & 1/2 AT NIGHT  Dispense: 270 tablet; Refill: 0    BMI 32.0-32.9,adult           Health Maintenance Due   Topic Date Due    Pneumococcal Vaccines (Age 0-64) (1 - PCV) Never done    Eye Exam  Never done    TETANUS VACCINE  Never done    Colorectal Cancer Screening  Never done    Hemoglobin A1c  02/16/2023    Diabetes Urine Screening  04/13/2023    Foot Exam  04/13/2023       Problem List Items Addressed This Visit          Endocrine    BMI 32.0-32.9,adult     Other Visit Diagnoses       Type 2 diabetes mellitus without complication, without long-term current use of insulin    -  Primary    Relevant Medications    metFORMIN (GLUCOPHAGE) 500 MG tablet    Other Relevant Orders    Hemoglobin A1C    Microalbumin/Creatinine Ratio, Urine    Urinalysis            Health Maintenance Topics with due status: Not Due       Topic Last Completion Date    Cervical Cancer Screening 07/12/2021    Lipid Panel 07/27/2022    Mammogram 08/19/2022    Low Dose Statin 03/31/2023       Future Appointments   Date Time Provider Department Center   7/17/2023  8:00 AM WADE Means [facility]        Patient Instructions   We will call you with your new A1C result and let you know if you need to take your medicine  differently. For now, take one and a half in the morning and one and a half at night.  If we need to discuss or look into a weekly injection, then we will revisit that according to your new A1C and see if we can find something affordable for you.     Follow up in about 3 months (around 6/30/2023) for Refills, A1C check.     Signature:  WADE Cid      Date of encounter: 3/31/23

## 2023-04-04 ENCOUNTER — TELEPHONE (OUTPATIENT)
Dept: FAMILY MEDICINE | Facility: CLINIC | Age: 53
End: 2023-04-04
Payer: COMMERCIAL

## 2023-04-04 DIAGNOSIS — E11.65 UNCONTROLLED TYPE 2 DIABETES MELLITUS WITH HYPERGLYCEMIA: Primary | ICD-10-CM

## 2023-04-04 RX ORDER — DULAGLUTIDE 0.75 MG/.5ML
INJECTION, SOLUTION SUBCUTANEOUS
Qty: 20 PEN | Refills: 0 | Status: SHIPPED | OUTPATIENT
Start: 2023-04-04 | End: 2023-06-19

## 2023-04-04 NOTE — PROGRESS NOTES
Increasing dose of metformin to 1000 mg BID and sending in a once weekly injectable for compliance purposes. Does not appear that she will be compliant with a daily insulin regimen. Metformin dosing increased so she is covered until she gets her weekly injectable. Having her follow up with her PCP to do further labs if she wants, discuss or change regimen as necessary.

## 2023-04-04 NOTE — TELEPHONE ENCOUNTER
----- Message from WADE Cid sent at 4/4/2023  8:48 AM CDT -----  A1C is over 10 this time. We are going to have to get her on an injectable to get better glucose control. I am going to send one in to her pharmacy for her to . She can take two metformin in the morning and two at night also. This will be 1000 mg twice a day. Call if it is too expensive. She needs to return for follow up with her PCP for continuity of care, to discuss regimen and do further tests within the next couple of weeks.

## 2023-05-16 ENCOUNTER — OFFICE VISIT (OUTPATIENT)
Dept: FAMILY MEDICINE | Facility: CLINIC | Age: 53
End: 2023-05-16
Payer: COMMERCIAL

## 2023-05-16 VITALS
HEIGHT: 63 IN | SYSTOLIC BLOOD PRESSURE: 125 MMHG | TEMPERATURE: 99 F | BODY MASS INDEX: 32.78 KG/M2 | HEART RATE: 73 BPM | DIASTOLIC BLOOD PRESSURE: 86 MMHG | WEIGHT: 185 LBS | OXYGEN SATURATION: 95 %

## 2023-05-16 DIAGNOSIS — J06.9 UPPER RESPIRATORY TRACT INFECTION, UNSPECIFIED TYPE: Primary | ICD-10-CM

## 2023-05-16 LAB
CTP QC/QA: YES
CTP QC/QA: YES
FLUAV AG NPH QL: NEGATIVE
FLUBV AG NPH QL: NEGATIVE
SARS-COV-2 AG RESP QL IA.RAPID: NEGATIVE

## 2023-05-16 PROCEDURE — 4010F PR ACE/ARB THEARPY RXD/TAKEN: ICD-10-PCS | Mod: ,,, | Performed by: REGISTERED NURSE

## 2023-05-16 PROCEDURE — 87426 SARS CORONAVIRUS 2 ANTIGEN POCT: ICD-10-PCS | Mod: QW,,, | Performed by: REGISTERED NURSE

## 2023-05-16 PROCEDURE — 99213 OFFICE O/P EST LOW 20 MIN: CPT | Mod: ,,, | Performed by: REGISTERED NURSE

## 2023-05-16 PROCEDURE — 3061F NEG MICROALBUMINURIA REV: CPT | Mod: ,,, | Performed by: REGISTERED NURSE

## 2023-05-16 PROCEDURE — 1160F RVW MEDS BY RX/DR IN RCRD: CPT | Mod: ,,, | Performed by: REGISTERED NURSE

## 2023-05-16 PROCEDURE — 3079F PR MOST RECENT DIASTOLIC BLOOD PRESSURE 80-89 MM HG: ICD-10-PCS | Mod: ,,, | Performed by: REGISTERED NURSE

## 2023-05-16 PROCEDURE — 3079F DIAST BP 80-89 MM HG: CPT | Mod: ,,, | Performed by: REGISTERED NURSE

## 2023-05-16 PROCEDURE — 1159F MED LIST DOCD IN RCRD: CPT | Mod: ,,, | Performed by: REGISTERED NURSE

## 2023-05-16 PROCEDURE — 3066F PR DOCUMENTATION OF TREATMENT FOR NEPHROPATHY: ICD-10-PCS | Mod: ,,, | Performed by: REGISTERED NURSE

## 2023-05-16 PROCEDURE — 4010F ACE/ARB THERAPY RXD/TAKEN: CPT | Mod: ,,, | Performed by: REGISTERED NURSE

## 2023-05-16 PROCEDURE — 3074F SYST BP LT 130 MM HG: CPT | Mod: ,,, | Performed by: REGISTERED NURSE

## 2023-05-16 PROCEDURE — 99213 PR OFFICE/OUTPT VISIT, EST, LEVL III, 20-29 MIN: ICD-10-PCS | Mod: ,,, | Performed by: REGISTERED NURSE

## 2023-05-16 PROCEDURE — 3074F PR MOST RECENT SYSTOLIC BLOOD PRESSURE < 130 MM HG: ICD-10-PCS | Mod: ,,, | Performed by: REGISTERED NURSE

## 2023-05-16 PROCEDURE — 87426 SARSCOV CORONAVIRUS AG IA: CPT | Mod: QW,,, | Performed by: REGISTERED NURSE

## 2023-05-16 PROCEDURE — 3046F PR MOST RECENT HEMOGLOBIN A1C LEVEL > 9.0%: ICD-10-PCS | Mod: ,,, | Performed by: REGISTERED NURSE

## 2023-05-16 PROCEDURE — 1159F PR MEDICATION LIST DOCUMENTED IN MEDICAL RECORD: ICD-10-PCS | Mod: ,,, | Performed by: REGISTERED NURSE

## 2023-05-16 PROCEDURE — 1160F PR REVIEW ALL MEDS BY PRESCRIBER/CLIN PHARMACIST DOCUMENTED: ICD-10-PCS | Mod: ,,, | Performed by: REGISTERED NURSE

## 2023-05-16 PROCEDURE — 3008F BODY MASS INDEX DOCD: CPT | Mod: ,,, | Performed by: REGISTERED NURSE

## 2023-05-16 PROCEDURE — 87804 POCT INFLUENZA A/B: ICD-10-PCS | Mod: QW,,, | Performed by: REGISTERED NURSE

## 2023-05-16 PROCEDURE — 3066F NEPHROPATHY DOC TX: CPT | Mod: ,,, | Performed by: REGISTERED NURSE

## 2023-05-16 PROCEDURE — 3046F HEMOGLOBIN A1C LEVEL >9.0%: CPT | Mod: ,,, | Performed by: REGISTERED NURSE

## 2023-05-16 PROCEDURE — 3008F PR BODY MASS INDEX (BMI) DOCUMENTED: ICD-10-PCS | Mod: ,,, | Performed by: REGISTERED NURSE

## 2023-05-16 PROCEDURE — 87804 INFLUENZA ASSAY W/OPTIC: CPT | Mod: QW,,, | Performed by: REGISTERED NURSE

## 2023-05-16 PROCEDURE — 3061F PR NEG MICROALBUMINURIA RESULT DOCUMENTED/REVIEW: ICD-10-PCS | Mod: ,,, | Performed by: REGISTERED NURSE

## 2023-05-16 RX ORDER — CEFDINIR 300 MG/1
300 CAPSULE ORAL 2 TIMES DAILY
Qty: 20 CAPSULE | Refills: 0 | Status: SHIPPED | OUTPATIENT
Start: 2023-05-16 | End: 2023-05-26

## 2023-05-16 RX ORDER — CHLORPHENIRAMINE MALEATE AND PHENYLEPHRINE HYDROCHLORIDE 4; 10 MG/1; MG/1
1 TABLET, COATED ORAL EVERY 4 HOURS PRN
Qty: 30 TABLET | Refills: 0 | Status: SHIPPED | OUTPATIENT
Start: 2023-05-16 | End: 2023-05-26 | Stop reason: ALTCHOICE

## 2023-05-16 RX ORDER — AZELASTINE 1 MG/ML
1 SPRAY, METERED NASAL 2 TIMES DAILY
Qty: 30 ML | Refills: 0 | Status: SHIPPED | OUTPATIENT
Start: 2023-05-16 | End: 2023-08-14

## 2023-05-16 RX ORDER — PROMETHAZINE HYDROCHLORIDE AND DEXTROMETHORPHAN HYDROBROMIDE 6.25; 15 MG/5ML; MG/5ML
5 SYRUP ORAL EVERY 8 HOURS PRN
Qty: 180 ML | Refills: 0 | Status: SHIPPED | OUTPATIENT
Start: 2023-05-16 | End: 2023-05-26 | Stop reason: ALTCHOICE

## 2023-05-16 NOTE — LETTER
May 16, 2023      [facility]  [address]  [place] [address]  Phone: [phone]  Fax: [phone]       Patient: Alisa Olguin   YOB: 1970  Date of Visit: 05/16/2023    To Whom It May Concern:    Emigdio Olguin  was at [facility] on 05/16/2023. The patient may return to work on 05/18/2023 with no restrictions. If you have any questions or concerns, or if I can be of further assistance, please do not hesitate to contact me.    Sincerely,      WADE Means

## 2023-05-16 NOTE — PROGRESS NOTES
WADE Means        PATIENT NAME: Alisa Olguin  : 1970  DATE: 23  MRN: [id number]      Billing Provider: WADE Means  Level of Service: DE OFFICE/OUTPT VISIT, EST, LEVL III, 20-29 MIN  Patient PCP Information       Provider PCP Type    WADE Means General            Reason for Visit / Chief Complaint: Cough (2 weeks)       Update PCP  Update Chief Complaint         History of Present Illness / Problem Focused Workflow     Cough  This is a new problem. The current episode started 1 to 4 weeks ago. The problem has been gradually worsening. The problem occurs every few minutes. The cough is Productive of sputum. Associated symptoms include chills, ear pain, headaches, nasal congestion, postnasal drip, rhinorrhea, a sore throat and shortness of breath. Pertinent negatives include no chest pain or fever. The symptoms are aggravated by exercise, dust, lying down, pollens and animals. Risk factors for lung disease include smoking/tobacco exposure. She has tried body position changes, cool air, OTC cough suppressant and rest for the symptoms. The treatment provided no relief.     Review of Systems     Review of Systems   Constitutional:  Positive for chills. Negative for fever.   HENT:  Positive for ear pain, postnasal drip, rhinorrhea and sore throat.    Respiratory:  Positive for cough and shortness of breath.    Cardiovascular:  Negative for chest pain.   Neurological:  Positive for headaches.      Medical / Social / Family History     Past Medical History:   Diagnosis Date    Diabetes mellitus, type 2     Hyperlipidemia     Hypertension      Past Surgical History:   Procedure Laterality Date    TUBAL LIGATION       Social History  Ms. Alisa Olguin  reports that she has never smoked. She has never been exposed to tobacco smoke. She has never used smokeless tobacco. She reports that she does not currently use alcohol. She reports that she does not use drugs.    Family  History  Ms. Alisa Olguin's family history is not on file.    Medications and Allergies     Medications  Outpatient Medications Marked as Taking for the 5/16/23 encounter (Office Visit) with WADE Means   Medication Sig Dispense Refill    gabapentin (NEURONTIN) 800 MG tablet Take 1 tablet (800 mg total) by mouth 3 (three) times daily. 270 tablet 1    lisinopriL-hydrochlorothiazide (PRINZIDE,ZESTORETIC) 20-12.5 mg per tablet Take 1 tablet by mouth once daily. 90 tablet 1    rosuvastatin (CRESTOR) 20 MG tablet Take 1 tablet (20 mg total) by mouth once daily. 90 tablet 1    [DISCONTINUED] dulaglutide (TRULICITY) 0.75 mg/0.5 mL pen injector Inject 0.75 mg into the skin every 7 days for 30 days, THEN 1.5 mg every 7 days. 20 pen 0    [DISCONTINUED] metFORMIN (GLUCOPHAGE) 500 MG tablet TAKE 1 & 1/2 (ONE & ONE-HALF) TABLETS BY MOUTH IN THE MORNING AND 1 & 1/2 AT NIGHT 270 tablet 0     Allergies  Review of patient's allergies indicates:  No Known Allergies    Physical Examination     Vitals:    05/16/23 1011   BP: 125/86   Pulse: 73   Temp: 98.6 °F (37 °C)     Physical Exam  Constitutional:       Appearance: She is obese. She is ill-appearing.   HENT:      Head: Normocephalic and atraumatic.      Right Ear: Tympanic membrane is erythematous.      Left Ear: Tympanic membrane is erythematous.      Nose: Congestion present.      Mouth/Throat:      Mouth: Mucous membranes are moist.      Pharynx: Posterior oropharyngeal erythema present.   Cardiovascular:      Rate and Rhythm: Normal rate and regular rhythm.      Pulses: Normal pulses.      Heart sounds: Normal heart sounds.   Pulmonary:      Effort: Pulmonary effort is normal.      Breath sounds: Normal breath sounds.   Musculoskeletal:         General: Normal range of motion.      Cervical back: Normal range of motion and neck supple.   Skin:     General: Skin is warm.   Neurological:      Mental Status: She is alert and oriented to person, place, and time.      Assessment and Plan (including Health Maintenance)      Problem List  Smart Sets  Document Outside HM   Plan:   Upper respiratory tract infection, unspecified type  -     POCT Influenza A/B Rapid Antigen  -     SARS Coronavirus 2 Antigen, POCT  -     azelastine (ASTELIN) 137 mcg (0.1 %) nasal spray; 1 spray (137 mcg total) by Nasal route 2 (two) times daily.  Dispense: 30 mL; Refill: 0  -     cefdinir (OMNICEF) 300 MG capsule; Take 1 capsule (300 mg total) by mouth 2 (two) times daily. for 10 days  Dispense: 20 capsule; Refill: 0  -     Discontinue: chlorpheniramine-phenylephrine (ED A-HIST) 4-10 mg per tablet; Take 1 tablet by mouth every 4 (four) hours as needed for Congestion or Allergies. (Patient not taking: Reported on 5/26/2023)  Dispense: 30 tablet; Refill: 0    Other orders  -     Discontinue: promethazine-dextromethorphan (PROMETHAZINE-DM) 6.25-15 mg/5 mL Syrp; Take 5 mLs by mouth every 8 (eight) hours as needed (cough).  Dispense: 180 mL; Refill: 0       Health Maintenance Due   Topic Date Due    Pneumococcal Vaccines (Age 0-64) (1 - PCV) Never done    Eye Exam  Never done    TETANUS VACCINE  Never done    Low Dose Statin  Never done    Colorectal Cancer Screening  Never done    Foot Exam  04/13/2023    Hemoglobin A1c  06/30/2023    Mammogram  08/19/2023     Problem List Items Addressed This Visit    None  Visit Diagnoses       Upper respiratory tract infection, unspecified type    -  Primary    Relevant Medications    azelastine (ASTELIN) 137 mcg (0.1 %) nasal spray    Other Relevant Orders    POCT Influenza A/B Rapid Antigen (Completed)    SARS Coronavirus 2 Antigen, POCT (Completed)          Health Maintenance Topics with due status: Not Due       Topic Last Completion Date    Cervical Cancer Screening 07/12/2021    Lipid Panel 07/27/2022    Diabetes Urine Screening 03/31/2023     Future Appointments   Date Time Provider Department Center   7/17/2023  8:00 AM WADE Means [facility] [place]      Patient Instructions   Increase fluid intake to stay hydrated. Take all doses of antibiotic therapy as prescribed. Do not stop taking when symptoms resolve, complete dosing. May take Tylenol or ibuprofen for fever or pain. Stay active, move around at least every 2 hours when awake. Go to the ED for any worsened condition or difficulty breathing. Return to clinic if condition persists.    No follow-ups on file.     Signature:  WADE Means      Date of encounter: 5/16/23

## 2023-05-26 ENCOUNTER — OFFICE VISIT (OUTPATIENT)
Dept: FAMILY MEDICINE | Facility: CLINIC | Age: 53
End: 2023-05-26
Payer: COMMERCIAL

## 2023-05-26 VITALS
TEMPERATURE: 98 F | OXYGEN SATURATION: 95 % | BODY MASS INDEX: 32.54 KG/M2 | DIASTOLIC BLOOD PRESSURE: 86 MMHG | HEIGHT: 63 IN | HEART RATE: 87 BPM | SYSTOLIC BLOOD PRESSURE: 125 MMHG | WEIGHT: 183.63 LBS

## 2023-05-26 DIAGNOSIS — J06.9 UPPER RESPIRATORY TRACT INFECTION, UNSPECIFIED TYPE: Primary | ICD-10-CM

## 2023-05-26 PROCEDURE — 96372 THER/PROPH/DIAG INJ SC/IM: CPT | Mod: ICN,,, | Performed by: REGISTERED NURSE

## 2023-05-26 PROCEDURE — 1160F RVW MEDS BY RX/DR IN RCRD: CPT | Mod: ICN,,, | Performed by: REGISTERED NURSE

## 2023-05-26 PROCEDURE — 3066F PR DOCUMENTATION OF TREATMENT FOR NEPHROPATHY: ICD-10-PCS | Mod: ICN,,, | Performed by: REGISTERED NURSE

## 2023-05-26 PROCEDURE — 3008F PR BODY MASS INDEX (BMI) DOCUMENTED: ICD-10-PCS | Mod: ICN,,, | Performed by: REGISTERED NURSE

## 2023-05-26 PROCEDURE — 4010F ACE/ARB THERAPY RXD/TAKEN: CPT | Mod: ICN,,, | Performed by: REGISTERED NURSE

## 2023-05-26 PROCEDURE — 1159F PR MEDICATION LIST DOCUMENTED IN MEDICAL RECORD: ICD-10-PCS | Mod: ICN,,, | Performed by: REGISTERED NURSE

## 2023-05-26 PROCEDURE — 1160F PR REVIEW ALL MEDS BY PRESCRIBER/CLIN PHARMACIST DOCUMENTED: ICD-10-PCS | Mod: ICN,,, | Performed by: REGISTERED NURSE

## 2023-05-26 PROCEDURE — 99213 OFFICE O/P EST LOW 20 MIN: CPT | Mod: 25,ICN,, | Performed by: REGISTERED NURSE

## 2023-05-26 PROCEDURE — 4010F PR ACE/ARB THEARPY RXD/TAKEN: ICD-10-PCS | Mod: ICN,,, | Performed by: REGISTERED NURSE

## 2023-05-26 PROCEDURE — 3046F HEMOGLOBIN A1C LEVEL >9.0%: CPT | Mod: ICN,,, | Performed by: REGISTERED NURSE

## 2023-05-26 PROCEDURE — 3074F SYST BP LT 130 MM HG: CPT | Mod: ICN,,, | Performed by: REGISTERED NURSE

## 2023-05-26 PROCEDURE — 3061F NEG MICROALBUMINURIA REV: CPT | Mod: ICN,,, | Performed by: REGISTERED NURSE

## 2023-05-26 PROCEDURE — 3061F PR NEG MICROALBUMINURIA RESULT DOCUMENTED/REVIEW: ICD-10-PCS | Mod: ICN,,, | Performed by: REGISTERED NURSE

## 2023-05-26 PROCEDURE — 3079F DIAST BP 80-89 MM HG: CPT | Mod: ICN,,, | Performed by: REGISTERED NURSE

## 2023-05-26 PROCEDURE — 3074F PR MOST RECENT SYSTOLIC BLOOD PRESSURE < 130 MM HG: ICD-10-PCS | Mod: ICN,,, | Performed by: REGISTERED NURSE

## 2023-05-26 PROCEDURE — 3079F PR MOST RECENT DIASTOLIC BLOOD PRESSURE 80-89 MM HG: ICD-10-PCS | Mod: ICN,,, | Performed by: REGISTERED NURSE

## 2023-05-26 PROCEDURE — 1159F MED LIST DOCD IN RCRD: CPT | Mod: ICN,,, | Performed by: REGISTERED NURSE

## 2023-05-26 PROCEDURE — 3066F NEPHROPATHY DOC TX: CPT | Mod: ICN,,, | Performed by: REGISTERED NURSE

## 2023-05-26 PROCEDURE — 99213 PR OFFICE/OUTPT VISIT, EST, LEVL III, 20-29 MIN: ICD-10-PCS | Mod: 25,ICN,, | Performed by: REGISTERED NURSE

## 2023-05-26 PROCEDURE — 3046F PR MOST RECENT HEMOGLOBIN A1C LEVEL > 9.0%: ICD-10-PCS | Mod: ICN,,, | Performed by: REGISTERED NURSE

## 2023-05-26 PROCEDURE — 96372 PR INJECTION,THERAP/PROPH/DIAG2ST, IM OR SUBCUT: ICD-10-PCS | Mod: ICN,,, | Performed by: REGISTERED NURSE

## 2023-05-26 PROCEDURE — 3008F BODY MASS INDEX DOCD: CPT | Mod: ICN,,, | Performed by: REGISTERED NURSE

## 2023-05-26 RX ORDER — BENZONATATE 100 MG/1
100 CAPSULE ORAL 3 TIMES DAILY PRN
Qty: 30 CAPSULE | Refills: 0 | Status: SHIPPED | OUTPATIENT
Start: 2023-05-26 | End: 2023-06-05

## 2023-05-26 RX ORDER — ALBUTEROL SULFATE 90 UG/1
2 AEROSOL, METERED RESPIRATORY (INHALATION) EVERY 6 HOURS PRN
Qty: 18 G | Refills: 0 | Status: SHIPPED | OUTPATIENT
Start: 2023-05-26 | End: 2024-05-25

## 2023-05-26 RX ORDER — LEVOCETIRIZINE DIHYDROCHLORIDE 5 MG/1
5 TABLET, FILM COATED ORAL NIGHTLY
Qty: 30 TABLET | Refills: 0 | Status: SHIPPED | OUTPATIENT
Start: 2023-05-26 | End: 2023-06-25

## 2023-05-26 RX ORDER — CEFTRIAXONE 1 G/1
1 INJECTION, POWDER, FOR SOLUTION INTRAMUSCULAR; INTRAVENOUS
Status: COMPLETED | OUTPATIENT
Start: 2023-05-26 | End: 2023-05-26

## 2023-05-26 RX ADMIN — CEFTRIAXONE 1 G: 1 INJECTION, POWDER, FOR SOLUTION INTRAMUSCULAR; INTRAVENOUS at 05:05

## 2023-05-26 NOTE — PROGRESS NOTES
WADE Means        PATIENT NAME: Alisa Olguin  : 1970  DATE: 23  MRN: [id number]      Billing Provider: WADE Means  Level of Service: [place] OFFICE/OUTPT VISIT, EST, LEVL III, 20-29 MIN  Patient PCP Information       Provider PCP Type    WADE Means General            Reason for Visit / Chief Complaint: Follow-up (Discuss diabetic meds; started Trulicity last Friday; wants to know if she still needs to take Metformin)       Update PCP  Update Chief Complaint         History of Present Illness / Problem Focused Workflow      Cough  This is a recurrent problem. The current episode started in the past 7 days. The problem has been gradually worsening. The cough is Non-productive. Associated symptoms include chills, ear pain, headaches, myalgias, postnasal drip, rhinorrhea, a sore throat and shortness of breath. Pertinent negatives include no chest pain or rash. The symptoms are aggravated by lying down. Risk factors for lung disease include smoking/tobacco exposure. She has tried cool air, body position changes and OTC cough suppressant for the symptoms. The treatment provided no relief.      Mrs. Olguin is a 52-year-old WF here for Diabetes follow-up. She started Trulicity about one week ago, has questions regarding oral diabetic agents. Her last A1C is March was 10.3. She had increased from 8.5 in 2022.     Review of Systems     Review of Systems   Constitutional:  Positive for chills.   HENT:  Positive for ear pain, postnasal drip, rhinorrhea and sore throat.    Respiratory:  Positive for cough and shortness of breath.    Cardiovascular:  Negative for chest pain.   Musculoskeletal:  Positive for myalgias.   Integumentary:  Negative for rash.   Neurological:  Positive for headaches.      Medical / Social / Family History     Past Medical History:   Diagnosis Date    Diabetes mellitus, type 2     Hyperlipidemia     Hypertension      Past Surgical History:   Procedure  Laterality Date    TUBAL LIGATION       Social History  Ms. Alisa Olguin  reports that she has never smoked. She has never been exposed to tobacco smoke. She has never used smokeless tobacco. She reports that she does not currently use alcohol. She reports that she does not use drugs.    Family History  Ms. Alisa Olguin's family history is not on file.    Medications and Allergies     Medications  Outpatient Medications Marked as Taking for the 23 encounter (Office Visit) with WAED Means   Medication Sig Dispense Refill    azelastine (ASTELIN) 137 mcg (0.1 %) nasal spray 1 spray (137 mcg total) by Nasal route 2 (two) times daily. 30 mL 0    [] cefdinir (OMNICEF) 300 MG capsule Take 1 capsule (300 mg total) by mouth 2 (two) times daily. for 10 days 20 capsule 0    [DISCONTINUED] dulaglutide (TRULICITY) 0.75 mg/0.5 mL pen injector Inject 0.75 mg into the skin every 7 days for 30 days, THEN 1.5 mg every 7 days. 20 pen 0    [DISCONTINUED] metFORMIN (GLUCOPHAGE) 500 MG tablet TAKE 1 & 1/2 (ONE & ONE-HALF) TABLETS BY MOUTH IN THE MORNING AND 1 & 1/2 AT NIGHT 270 tablet 0    [DISCONTINUED] promethazine-dextromethorphan (PROMETHAZINE-DM) 6.25-15 mg/5 mL Syrp Take 5 mLs by mouth every 8 (eight) hours as needed (cough). 180 mL 0       Allergies  Review of patient's allergies indicates:  No Known Allergies    Physical Examination     Vitals:    23 1617   BP: 125/86   Pulse: 87   Temp: 97.6 °F (36.4 °C)     Physical Exam     Assessment and Plan (including Health Maintenance)      Problem List  Smart Sets  Document Outside HM   Plan:   Upper respiratory tract infection, unspecified type  -     cefTRIAXone injection 1 g  -     benzonatate (TESSALON) 100 MG capsule; Take 1 capsule (100 mg total) by mouth 3 (three) times daily as needed for Cough.  Dispense: 30 capsule; Refill: 0  -     levocetirizine (XYZAL) 5 MG tablet; Take 1 tablet (5 mg total) by mouth every evening.  Dispense: 30 tablet;  Refill: 0  -     albuterol (VENTOLIN HFA) 90 mcg/actuation inhaler; Inhale 2 puffs into the lungs every 6 (six) hours as needed for Wheezing. Rescue  Dispense: 18 g; Refill: 0  -     X-Ray Chest PA And Lateral; Future; Expected date: 05/26/2023       Discussed Diabetic management, instructed she is to take all doses of Metformin as ordered as well as taking Trulicity once per week as prescribed. Trulicity is typically started at 0.75mg injected once per week for 4 weeks and then is increased to 1.5mg injected once per week every week thereafter. Trulicity dosing goes as high as 3mg per week, will increase to this dose if A1C is not improved with 1.5mg weekly injection.     Health Maintenance Due   Topic Date Due    Pneumococcal Vaccines (Age 0-64) (1 - PCV) Never done    Eye Exam  Never done    TETANUS VACCINE  Never done    Low Dose Statin  Never done    Colorectal Cancer Screening  Never done    Foot Exam  04/13/2023    Hemoglobin A1c  06/30/2023    Mammogram  08/19/2023     Problem List Items Addressed This Visit    None  Visit Diagnoses       Upper respiratory tract infection, unspecified type    -  Primary    Relevant Medications    albuterol (VENTOLIN HFA) 90 mcg/actuation inhaler    Other Relevant Orders    X-Ray Chest PA And Lateral (Completed)          Health Maintenance Topics with due status: Not Due       Topic Last Completion Date    Cervical Cancer Screening 07/12/2021    Lipid Panel 07/27/2022    Diabetes Urine Screening 03/31/2023     Future Appointments   Date Time Provider Department Center   7/17/2023  8:00 AM WADE Means [facility]      Patient Instructions   Increase fluid intake to stay hydrated. Take all doses of antibiotic therapy as prescribed. Do not stop taking when symptoms resolve, complete dosing. May take Tylenol or ibuprofen for fever or pain. Stay active, move around at least every 2 hours when awake. Go to the ED for any worsened condition or difficulty  breathing. Return to clinic if condition persists.  No follow-ups on file.     Signature:  WADE Means    Date of encounter: 5/26/23

## 2023-06-12 DIAGNOSIS — E11.9 TYPE 2 DIABETES MELLITUS WITHOUT COMPLICATION, WITHOUT LONG-TERM CURRENT USE OF INSULIN: ICD-10-CM

## 2023-06-12 DIAGNOSIS — E11.65 UNCONTROLLED TYPE 2 DIABETES MELLITUS WITH HYPERGLYCEMIA: ICD-10-CM

## 2023-06-19 RX ORDER — DULAGLUTIDE 0.75 MG/.5ML
1.5 INJECTION, SOLUTION SUBCUTANEOUS
Qty: 24 PEN | Refills: 0 | Status: SHIPPED | OUTPATIENT
Start: 2023-06-19 | End: 2023-07-17 | Stop reason: SDUPTHER

## 2023-06-19 RX ORDER — METFORMIN HYDROCHLORIDE 500 MG/1
TABLET ORAL
Qty: 270 TABLET | Refills: 0 | Status: SHIPPED | OUTPATIENT
Start: 2023-06-19 | End: 2023-07-17 | Stop reason: SDUPTHER

## 2023-06-30 ENCOUNTER — PATIENT OUTREACH (OUTPATIENT)
Dept: ADMINISTRATIVE | Facility: HOSPITAL | Age: 53
End: 2023-06-30

## 2023-07-17 ENCOUNTER — OFFICE VISIT (OUTPATIENT)
Dept: FAMILY MEDICINE | Facility: CLINIC | Age: 53
End: 2023-07-17
Payer: COMMERCIAL

## 2023-07-17 VITALS
BODY MASS INDEX: 31.43 KG/M2 | SYSTOLIC BLOOD PRESSURE: 118 MMHG | HEART RATE: 72 BPM | DIASTOLIC BLOOD PRESSURE: 87 MMHG | OXYGEN SATURATION: 98 % | WEIGHT: 177.38 LBS | HEIGHT: 63 IN | TEMPERATURE: 99 F

## 2023-07-17 DIAGNOSIS — L60.0 INGROWN NAIL OF GREAT TOE: ICD-10-CM

## 2023-07-17 DIAGNOSIS — E78.5 HYPERLIPIDEMIA, UNSPECIFIED HYPERLIPIDEMIA TYPE: ICD-10-CM

## 2023-07-17 DIAGNOSIS — G62.9 NEUROPATHY: ICD-10-CM

## 2023-07-17 DIAGNOSIS — E11.65 UNCONTROLLED TYPE 2 DIABETES MELLITUS WITH HYPERGLYCEMIA: ICD-10-CM

## 2023-07-17 DIAGNOSIS — D51.9 ANEMIA DUE TO VITAMIN B12 DEFICIENCY, UNSPECIFIED B12 DEFICIENCY TYPE: ICD-10-CM

## 2023-07-17 DIAGNOSIS — I10 ESSENTIAL HYPERTENSION: ICD-10-CM

## 2023-07-17 DIAGNOSIS — Z00.00 ROUTINE GENERAL MEDICAL EXAMINATION AT A HEALTH CARE FACILITY: Primary | ICD-10-CM

## 2023-07-17 LAB
ALBUMIN SERPL BCP-MCNC: 4.1 G/DL (ref 3.5–5)
ALBUMIN/GLOB SERPL: 1 {RATIO}
ALP SERPL-CCNC: 109 U/L (ref 41–108)
ALT SERPL W P-5'-P-CCNC: 38 U/L (ref 13–56)
ANION GAP SERPL CALCULATED.3IONS-SCNC: 10 MMOL/L (ref 7–16)
AST SERPL W P-5'-P-CCNC: 18 U/L (ref 15–37)
BASOPHILS # BLD AUTO: 0.05 K/UL (ref 0–0.2)
BASOPHILS NFR BLD AUTO: 0.6 % (ref 0–1)
BILIRUB SERPL-MCNC: 0.8 MG/DL (ref ?–1.2)
BILIRUB UR QL STRIP: NEGATIVE
BUN SERPL-MCNC: 18 MG/DL (ref 7–18)
BUN/CREAT SERPL: 19 (ref 6–20)
CALCIUM SERPL-MCNC: 9.7 MG/DL (ref 8.5–10.1)
CHLORIDE SERPL-SCNC: 104 MMOL/L (ref 98–107)
CHOLEST SERPL-MCNC: 170 MG/DL (ref 0–200)
CHOLEST/HDLC SERPL: 3.6 {RATIO}
CLARITY UR: CLEAR
CO2 SERPL-SCNC: 29 MMOL/L (ref 21–32)
COLOR UR: COLORLESS
CREAT SERPL-MCNC: 0.97 MG/DL (ref 0.55–1.02)
CREAT UR-MCNC: 323 MG/DL (ref 28–219)
DIFFERENTIAL METHOD BLD: ABNORMAL
EGFR (NO RACE VARIABLE) (RUSH/TITUS): 70 ML/MIN/1.73M2
EOSINOPHIL # BLD AUTO: 0.24 K/UL (ref 0–0.5)
EOSINOPHIL NFR BLD AUTO: 2.8 % (ref 1–4)
ERYTHROCYTE [DISTWIDTH] IN BLOOD BY AUTOMATED COUNT: 12.2 % (ref 11.5–14.5)
EST. AVERAGE GLUCOSE BLD GHB EST-MCNC: 150 MG/DL
GLOBULIN SER-MCNC: 4 G/DL (ref 2–4)
GLUCOSE SERPL-MCNC: 135 MG/DL (ref 74–106)
GLUCOSE UR STRIP-MCNC: NORMAL MG/DL
HBA1C MFR BLD HPLC: 7.1 % (ref 4.5–6.6)
HCT VFR BLD AUTO: 43.9 % (ref 38–47)
HDLC SERPL-MCNC: 47 MG/DL (ref 40–60)
HGB BLD-MCNC: 15 G/DL (ref 12–16)
IMM GRANULOCYTES # BLD AUTO: 0.01 K/UL (ref 0–0.04)
IMM GRANULOCYTES NFR BLD: 0.1 % (ref 0–0.4)
KETONES UR STRIP-SCNC: NEGATIVE MG/DL
LDLC SERPL CALC-MCNC: 79 MG/DL
LDLC/HDLC SERPL: 1.7 {RATIO}
LEUKOCYTE ESTERASE UR QL STRIP: NEGATIVE
LYMPHOCYTES # BLD AUTO: 2.02 K/UL (ref 1–4.8)
LYMPHOCYTES NFR BLD AUTO: 23.2 % (ref 27–41)
MCH RBC QN AUTO: 30.2 PG (ref 27–31)
MCHC RBC AUTO-ENTMCNC: 34.2 G/DL (ref 32–36)
MCV RBC AUTO: 88.3 FL (ref 80–96)
MICROALBUMIN UR-MCNC: 7.4 MG/DL (ref 0–2.8)
MICROALBUMIN/CREAT RATIO PNL UR: 22.9 MG/G (ref 0–30)
MONOCYTES # BLD AUTO: 0.6 K/UL (ref 0–0.8)
MONOCYTES NFR BLD AUTO: 6.9 % (ref 2–6)
MPC BLD CALC-MCNC: 9.2 FL (ref 9.4–12.4)
NEUTROPHILS # BLD AUTO: 5.8 K/UL (ref 1.8–7.7)
NEUTROPHILS NFR BLD AUTO: 66.4 % (ref 53–65)
NITRITE UR QL STRIP: NEGATIVE
NONHDLC SERPL-MCNC: 123 MG/DL
NRBC # BLD AUTO: 0 X10E3/UL
NRBC, AUTO (.00): 0 %
PH UR STRIP: 5 PH UNITS
PLATELET # BLD AUTO: 335 K/UL (ref 150–400)
POTASSIUM SERPL-SCNC: 4.4 MMOL/L (ref 3.5–5.1)
PROT SERPL-MCNC: 8.1 G/DL (ref 6.4–8.2)
PROT UR QL STRIP: NEGATIVE
RBC # BLD AUTO: 4.97 M/UL (ref 4.2–5.4)
RBC # UR STRIP: NEGATIVE /UL
SODIUM SERPL-SCNC: 139 MMOL/L (ref 136–145)
SP GR UR STRIP: 1.01
TRIGL SERPL-MCNC: 219 MG/DL (ref 35–150)
UROBILINOGEN UR STRIP-ACNC: NORMAL MG/DL
VLDLC SERPL-MCNC: 44 MG/DL
WBC # BLD AUTO: 8.72 K/UL (ref 4.5–11)

## 2023-07-17 PROCEDURE — 1159F PR MEDICATION LIST DOCUMENTED IN MEDICAL RECORD: ICD-10-PCS | Mod: ,,, | Performed by: REGISTERED NURSE

## 2023-07-17 PROCEDURE — 3079F PR MOST RECENT DIASTOLIC BLOOD PRESSURE 80-89 MM HG: ICD-10-PCS | Mod: ,,, | Performed by: REGISTERED NURSE

## 2023-07-17 PROCEDURE — 4010F ACE/ARB THERAPY RXD/TAKEN: CPT | Mod: ,,, | Performed by: REGISTERED NURSE

## 2023-07-17 PROCEDURE — 82570 MICROALBUMIN / CREATININE RATIO URINE: ICD-10-PCS | Mod: ,,, | Performed by: CLINICAL MEDICAL LABORATORY

## 2023-07-17 PROCEDURE — 1160F RVW MEDS BY RX/DR IN RCRD: CPT | Mod: ,,, | Performed by: REGISTERED NURSE

## 2023-07-17 PROCEDURE — 85025 COMPLETE CBC W/AUTO DIFF WBC: CPT | Mod: ,,, | Performed by: CLINICAL MEDICAL LABORATORY

## 2023-07-17 PROCEDURE — 83036 HEMOGLOBIN A1C: ICD-10-PCS | Mod: ,,, | Performed by: CLINICAL MEDICAL LABORATORY

## 2023-07-17 PROCEDURE — 82043 MICROALBUMIN / CREATININE RATIO URINE: ICD-10-PCS | Mod: ,,, | Performed by: CLINICAL MEDICAL LABORATORY

## 2023-07-17 PROCEDURE — 82043 UR ALBUMIN QUANTITATIVE: CPT | Mod: ,,, | Performed by: CLINICAL MEDICAL LABORATORY

## 2023-07-17 PROCEDURE — 3008F PR BODY MASS INDEX (BMI) DOCUMENTED: ICD-10-PCS | Mod: ,,, | Performed by: REGISTERED NURSE

## 2023-07-17 PROCEDURE — 81003 URINALYSIS, REFLEX TO URINE CULTURE: ICD-10-PCS | Mod: QW,,, | Performed by: CLINICAL MEDICAL LABORATORY

## 2023-07-17 PROCEDURE — 1159F MED LIST DOCD IN RCRD: CPT | Mod: ,,, | Performed by: REGISTERED NURSE

## 2023-07-17 PROCEDURE — 80061 LIPID PANEL: ICD-10-PCS | Mod: ,,, | Performed by: CLINICAL MEDICAL LABORATORY

## 2023-07-17 PROCEDURE — 82570 ASSAY OF URINE CREATININE: CPT | Mod: ,,, | Performed by: CLINICAL MEDICAL LABORATORY

## 2023-07-17 PROCEDURE — 80053 COMPREHEN METABOLIC PANEL: CPT | Mod: ,,, | Performed by: CLINICAL MEDICAL LABORATORY

## 2023-07-17 PROCEDURE — 3046F PR MOST RECENT HEMOGLOBIN A1C LEVEL > 9.0%: ICD-10-PCS | Mod: ,,, | Performed by: REGISTERED NURSE

## 2023-07-17 PROCEDURE — 80061 LIPID PANEL: CPT | Mod: ,,, | Performed by: CLINICAL MEDICAL LABORATORY

## 2023-07-17 PROCEDURE — 3066F NEPHROPATHY DOC TX: CPT | Mod: ,,, | Performed by: REGISTERED NURSE

## 2023-07-17 PROCEDURE — 4010F PR ACE/ARB THEARPY RXD/TAKEN: ICD-10-PCS | Mod: ,,, | Performed by: REGISTERED NURSE

## 2023-07-17 PROCEDURE — 1160F PR REVIEW ALL MEDS BY PRESCRIBER/CLIN PHARMACIST DOCUMENTED: ICD-10-PCS | Mod: ,,, | Performed by: REGISTERED NURSE

## 2023-07-17 PROCEDURE — 81003 URINALYSIS AUTO W/O SCOPE: CPT | Mod: QW,,, | Performed by: CLINICAL MEDICAL LABORATORY

## 2023-07-17 PROCEDURE — 80053 COMPREHENSIVE METABOLIC PANEL: ICD-10-PCS | Mod: ,,, | Performed by: CLINICAL MEDICAL LABORATORY

## 2023-07-17 PROCEDURE — 3008F BODY MASS INDEX DOCD: CPT | Mod: ,,, | Performed by: REGISTERED NURSE

## 2023-07-17 PROCEDURE — 99396 PREV VISIT EST AGE 40-64: CPT | Mod: ,,, | Performed by: REGISTERED NURSE

## 2023-07-17 PROCEDURE — 3066F PR DOCUMENTATION OF TREATMENT FOR NEPHROPATHY: ICD-10-PCS | Mod: ,,, | Performed by: REGISTERED NURSE

## 2023-07-17 PROCEDURE — 3079F DIAST BP 80-89 MM HG: CPT | Mod: ,,, | Performed by: REGISTERED NURSE

## 2023-07-17 PROCEDURE — 3046F HEMOGLOBIN A1C LEVEL >9.0%: CPT | Mod: ,,, | Performed by: REGISTERED NURSE

## 2023-07-17 PROCEDURE — 3061F NEG MICROALBUMINURIA REV: CPT | Mod: ,,, | Performed by: REGISTERED NURSE

## 2023-07-17 PROCEDURE — 3074F SYST BP LT 130 MM HG: CPT | Mod: ,,, | Performed by: REGISTERED NURSE

## 2023-07-17 PROCEDURE — 83036 HEMOGLOBIN GLYCOSYLATED A1C: CPT | Mod: ,,, | Performed by: CLINICAL MEDICAL LABORATORY

## 2023-07-17 PROCEDURE — 99396 PR PREVENTIVE VISIT,EST,40-64: ICD-10-PCS | Mod: ,,, | Performed by: REGISTERED NURSE

## 2023-07-17 PROCEDURE — 85025 CBC WITH DIFFERENTIAL: ICD-10-PCS | Mod: ,,, | Performed by: CLINICAL MEDICAL LABORATORY

## 2023-07-17 PROCEDURE — 3061F PR NEG MICROALBUMINURIA RESULT DOCUMENTED/REVIEW: ICD-10-PCS | Mod: ,,, | Performed by: REGISTERED NURSE

## 2023-07-17 PROCEDURE — 3074F PR MOST RECENT SYSTOLIC BLOOD PRESSURE < 130 MM HG: ICD-10-PCS | Mod: ,,, | Performed by: REGISTERED NURSE

## 2023-07-17 RX ORDER — METFORMIN HYDROCHLORIDE 500 MG/1
1000 TABLET ORAL 2 TIMES DAILY WITH MEALS
Qty: 360 TABLET | Refills: 1 | Status: SHIPPED | OUTPATIENT
Start: 2023-07-17 | End: 2023-11-13

## 2023-07-17 RX ORDER — LISINOPRIL AND HYDROCHLOROTHIAZIDE 12.5; 2 MG/1; MG/1
1 TABLET ORAL DAILY
Qty: 90 TABLET | Refills: 1 | Status: SHIPPED | OUTPATIENT
Start: 2023-07-17 | End: 2024-02-20 | Stop reason: SDUPTHER

## 2023-07-17 RX ORDER — GABAPENTIN 800 MG/1
800 TABLET ORAL 3 TIMES DAILY
Qty: 270 TABLET | Refills: 1 | Status: SHIPPED | OUTPATIENT
Start: 2023-07-17 | End: 2024-01-13

## 2023-07-17 RX ORDER — DULAGLUTIDE 0.75 MG/.5ML
1.5 INJECTION, SOLUTION SUBCUTANEOUS
Qty: 24 PEN | Refills: 1 | Status: SHIPPED | OUTPATIENT
Start: 2023-07-17 | End: 2024-01-13

## 2023-07-17 RX ORDER — SULFAMETHOXAZOLE AND TRIMETHOPRIM 800; 160 MG/1; MG/1
1 TABLET ORAL 2 TIMES DAILY
Qty: 14 TABLET | Refills: 0 | Status: SHIPPED | OUTPATIENT
Start: 2023-07-17 | End: 2023-07-24

## 2023-07-17 RX ORDER — ROSUVASTATIN CALCIUM 20 MG/1
20 TABLET, COATED ORAL DAILY
Qty: 90 TABLET | Refills: 1 | Status: SHIPPED | OUTPATIENT
Start: 2023-07-17 | End: 2024-01-13

## 2023-07-17 NOTE — PATIENT INSTRUCTIONS
Return for next appointment in 3 months, sooner if needed. Take all medications as prescribed. Do not stop or start any new medications without consulting with your provider. If you have access to blood pressure monitor at home, may check blood pressure as needed if symptoms of high or low blood pressure evident.      Take all medications as prescribed, do not stop or start any new medications without first consulting with your pcp or specialist. Make sure are drinking at least 6-8 glasses of water per day.   Be physically active for 30 minutes most days of the week. Break this up into three 10-minute sessions when pressed for time. Healthy movement may include walking, sports, dancing, yoga or running.  Eat a well-balanced, low-fat diet with lots of fruits, vegetables, and whole grains. Choose a diet that's low in saturated fat and cholesterol, and moderate in sugar, salt and total fat.  Avoid injury by wearing seatbelts and bike helmets, using smoke and carbon monoxide detectors in the home, and using street smarts when walking alone. If you own a gun, recognize the dangers of having a gun in your home. Use safety precautions at all times.  Don't smoke, and quit if you do. Ask your health care provider for help. [facility] offers a smoking cessation program.  If you drink alcohol, drink in moderation. Never drink before or when driving.   Ask someone you trust for help if you think you might be addicted to drugs or alcohol.  Help prevent sexually transmitted infections (STIs) and HIV/AIDS by using condoms every time you have sexual contact. Keep in mind, condoms are not 100 percent foolproof, so discuss STI screening with your provider. Birth control methods other than condoms, such as pills and implants, won't protect you from STIs or HIV.  Brush your teeth after meals with a soft or medium bristled toothbrush. Also brush after drinking, before going to bed. Use dental floss daily.  Stay out of the sun, especially  between 10 a.m. and 3 p.m. when the sun's harmful rays are strongest. Don't think you are safe if it is cloudy or if you are in the water, as harmful rays pass through both. Use a broad spectrum sunscreen that guards against both UVA and UVB rays, with a sun protection factor (SPF) of 15 or higher. Select sunglasses that block 99 to 100 percent of the sun's rays.  Learn to recognize and manage stress in your life. Signs of stress include trouble sleeping, frequent headaches and stomach problems; being angry a lot; and turning to food, drugs and alcohol to relieve stress. Good ways to deal with stress include regular exercise, healthy eating habits, and relaxation exercises such as deep breathing or meditation. Talking to trusted family members and friends can help a lot. Some women find that interacting with their torri community is helpful in times of stress. Get enough sleep and rest - adults need around eight hours of sleep a night. Talk to your health care provider if you feel depressed for more than a few days. Depression is a treatable illness. Signs of depression include feeling empty and sad, crying a lot, loss of interest in life, and thoughts of death or suicide. If you or someone you know has thoughts of suicide, get help right away. Call 911, a local crisis center or (800) SUICIDE.

## 2023-07-17 NOTE — LETTER
July 17, 2023      [facility]  [address]  [place] [address]  Phone: [phone]  Fax: [phone]       Patient: Alisa Olguin   YOB: 1970  Date of Visit: 07/17/2023    To Whom It May Concern:    Emigdio Olguin  was at [facility] on 07/17/2023. She may return to work/school on 07/18/2023 with no restrictions. If you have any questions or concerns, or if I can be of further assistance, please do not hesitate to contact me.    Sincerely,        WADE Means

## 2023-07-17 NOTE — PROGRESS NOTES
WADE Means        PATIENT NAME: Alisa Olguin  : 1970  DATE: 23  MRN: [id number]      Billing Provider: WADE Means  Level of Service: [place] PREVENTIVE VISIT,EST,40-64  Patient PCP Information       Provider PCP Type    WADE Means General            Reason for Visit / Chief Complaint: Follow-up and Diabetes (Needs A1c checked)       Update PCP  Update Chief Complaint         History of Present Illness / Problem Focused Workflow      HPI Mrs. Olugin is a 52-year-old WF here for [facility] wellness exam. Last A1C done 2023, greater than 10. She has been on Trulicity 1.5mg injected once per week and continues taking Metformin 1000mg twice per day with meals. She has a self-purchased glucometer, [employer], buys strips over the counter/out of pocket. She has had the meter for about 2 years, states she is worried about accuracy of results. Ingrown toenail of left great toe, tenderness, no drainage or streaking noted.  reviewed, she last picked up Gabapentin, 90 day supply in February.     Review of Systems     Review of Systems   Constitutional: Negative.    Respiratory: Negative.     Cardiovascular: Negative.    Gastrointestinal: Negative.    Genitourinary: Negative.    Musculoskeletal:  Positive for arthralgias, joint swelling and leg pain.        Tingling, burning sensation in bilateral lower extremities secondary to Neuropathy, Diabetes.    Integumentary:  Positive for color change.   Neurological: Negative.    Psychiatric/Behavioral: Negative.        Medical / Social / Family History     Past Medical History:   Diagnosis Date    Diabetes mellitus, type 2     Hyperlipidemia     Hypertension      Past Surgical History:   Procedure Laterality Date    TUBAL LIGATION       Social History  Ms. Alisa Olguin  reports that she has never smoked. She has never been exposed to tobacco smoke. She has never used smokeless tobacco. She reports that she does not currently use alcohol.  She reports that she does not use drugs.    Family History  Ms. Alisa Olguin's family history is not on file.    Medications and Allergies     Medications  Outpatient Medications Marked as Taking for the 7/17/23 encounter (Office Visit) with WADE Means   Medication Sig Dispense Refill    albuterol (VENTOLIN HFA) 90 mcg/actuation inhaler Inhale 2 puffs into the lungs every 6 (six) hours as needed for Wheezing. Rescue 18 g 0    azelastine (ASTELIN) 137 mcg (0.1 %) nasal spray 1 spray (137 mcg total) by Nasal route 2 (two) times daily. 30 mL 0    [DISCONTINUED] dulaglutide (TRULICITY) 0.75 mg/0.5 mL pen injector Inject 1.5 mg into the skin every 7 days. 24 pen 0    [DISCONTINUED] metFORMIN (GLUCOPHAGE) 500 MG tablet TAKE 1 & 1/2 (ONE & ONE-HALF) TABLETS BY MOUTH IN THE MORNING AND 1 & 1/2 (ONE & ONE-HALF) NIGHTLY 270 tablet 0     Allergies  Review of patient's allergies indicates:  No Known Allergies    Physical Examination     Vitals:    07/17/23 0828   BP: 118/87   Pulse: 72   Temp: 98.5 °F (36.9 °C)     Physical Exam  Vitals and nursing note reviewed.   Constitutional:       Appearance: Normal appearance. She is obese.   HENT:      Head: Normocephalic and atraumatic.      Mouth/Throat:      Mouth: Mucous membranes are moist.      Pharynx: Oropharynx is clear.   Cardiovascular:      Rate and Rhythm: Normal rate and regular rhythm.      Pulses:           Dorsalis pedis pulses are 1+ on the right side and 1+ on the left side.        Posterior tibial pulses are 1+ on the right side and 1+ on the left side.      Heart sounds: Normal heart sounds.   Pulmonary:      Effort: Pulmonary effort is normal.      Breath sounds: Normal breath sounds.   Abdominal:      General: Bowel sounds are normal.      Palpations: Abdomen is soft.   Musculoskeletal:      Cervical back: Normal range of motion.      Right foot: Normal range of motion. No deformity, bunion, Charcot foot, foot drop or prominent metatarsal heads.       Left foot: Normal range of motion. No deformity, bunion, Charcot foot, foot drop or prominent metatarsal heads.   Feet:      Right foot:      Skin integrity: Dry skin present.      Left foot:      Skin integrity: Erythema and dry skin present.      Toenail Condition: Left toenails are ingrown.   Skin:     General: Skin is warm and dry.      Findings: Erythema present.   Neurological:      Mental Status: She is alert and oriented to person, place, and time.        Assessment and Plan (including Health Maintenance)      Problem List  Smart Sets  Document Outside HM   Plan:   Routine general medical examination at a health care facility    Uncontrolled type 2 diabetes mellitus with hyperglycemia  -     Microalbumin/Creatinine Ratio, Urine; Future; Expected date: 07/17/2023  -     Urinalysis, Reflex to Urine Culture; Future; Expected date: 07/17/2023  -     Hemoglobin A1C; Future; Expected date: 07/17/2023  -     metFORMIN (GLUCOPHAGE) 500 MG tablet; Take 2 tablets (1,000 mg total) by mouth 2 (two) times daily with meals.  Dispense: 360 tablet; Refill: 1  -     dulaglutide (TRULICITY) 0.75 mg/0.5 mL pen injector; Inject 1.5 mg into the skin every 7 days.  Dispense: 24 pen ; Refill: 1  -     Ambulatory referral/consult to Podiatry; Future; Expected date: 07/24/2023    Essential hypertension  -     Comprehensive Metabolic Panel; Future; Expected date: 07/17/2023  -     lisinopriL-hydrochlorothiazide (PRINZIDE,ZESTORETIC) 20-12.5 mg per tablet; Take 1 tablet by mouth once daily.  Dispense: 90 tablet; Refill: 1    Anemia due to vitamin B12 deficiency, unspecified B12 deficiency type  -     CBC Auto Differential; Future; Expected date: 07/17/2023    Hyperlipidemia, unspecified hyperlipidemia type  -     Lipid Panel; Future; Expected date: 07/17/2023  -     rosuvastatin (CRESTOR) 20 MG tablet; Take 1 tablet (20 mg total) by mouth once daily.  Dispense: 90 tablet; Refill: 1    Neuropathy  -     gabapentin (NEURONTIN) 800 MG  tablet; Take 1 tablet (800 mg total) by mouth 3 (three) times daily.  Dispense: 270 tablet; Refill: 1    Ingrown nail of great toe  -     sulfamethoxazole-trimethoprim 800-160mg (BACTRIM DS) 800-160 mg Tab; Take 1 tablet by mouth 2 (two) times daily. for 7 days  Dispense: 14 tablet; Refill: 0    Referring to Dr. Denver Carter at [facility]. Order for continuous glucometer/blood sugar monitoring, [employer], sent to [facility]. Patient is a brittle diabetic with hyperglycemia and would benefit from continuous monitoring so medications can be adjusted accordingly. Will review lab work and update plan of care as needed.     Health Maintenance Due   Topic Date Due    COVID-19 Vaccine (1) Never done    Pneumococcal Vaccines (Age 0-64) (1 - PCV) Never done    Eye Exam  Never done    TETANUS VACCINE  Never done    Colorectal Cancer Screening  Never done    Shingles Vaccine (1 of 2) Never done    Foot Exam  04/13/2023    Hemoglobin A1c  06/30/2023    Mammogram  08/19/2023     Problem List Items Addressed This Visit    None  Visit Diagnoses       Routine general medical examination at a health care facility    -  Primary    Uncontrolled type 2 diabetes mellitus with hyperglycemia        Relevant Medications    metFORMIN (GLUCOPHAGE) 500 MG tablet    dulaglutide (TRULICITY) 0.75 mg/0.5 mL pen injector    Other Relevant Orders    Microalbumin/Creatinine Ratio, Urine    Urinalysis, Reflex to Urine Culture    Hemoglobin A1C    Ambulatory referral/consult to Podiatry    Essential hypertension        Relevant Medications    lisinopriL-hydrochlorothiazide (PRINZIDE,ZESTORETIC) 20-12.5 mg per tablet    Other Relevant Orders    Comprehensive Metabolic Panel    Anemia due to vitamin B12 deficiency, unspecified B12 deficiency type        Relevant Orders    CBC Auto Differential    Hyperlipidemia, unspecified hyperlipidemia type        Relevant Medications    rosuvastatin (CRESTOR) 20 MG tablet    Other  Relevant Orders    Lipid Panel    Neuropathy        Relevant Medications    gabapentin (NEURONTIN) 800 MG tablet    Ingrown nail of great toe        Relevant Medications    sulfamethoxazole-trimethoprim 800-160mg (BACTRIM DS) 800-160 mg Tab          Health Maintenance Topics with due status: Not Due       Topic Last Completion Date    Cervical Cancer Screening 07/12/2021    Lipid Panel 07/27/2022    Influenza Vaccine 11/14/2022    Diabetes Urine Screening 03/31/2023    Low Dose Statin 07/17/2023     Future Appointments   Date Time Provider Department Center   10/20/2023  3:30 PM Remy Yancey [facility]   7/18/2024  8:00 AM Remy Yancey [facility]      Patient Instructions   Return for next appointment in 3 months, sooner if needed. Take all medications as prescribed. Do not stop or start any new medications without consulting with your provider. If you have access to blood pressure monitor at home, may check blood pressure as needed if symptoms of high or low blood pressure evident.      Take all medications as prescribed, do not stop or start any new medications without first consulting with your pcp or specialist. Make sure are drinking at least 6-8 glasses of water per day.   Be physically active for 30 minutes most days of the week. Break this up into three 10-minute sessions when pressed for time. Healthy movement may include walking, sports, dancing, yoga or running.  Eat a well-balanced, low-fat diet with lots of fruits, vegetables, and whole grains. Choose a diet that's low in saturated fat and cholesterol, and moderate in sugar, salt and total fat.  Avoid injury by wearing seatbelts and bike helmets, using smoke and carbon monoxide detectors in the home, and using street smarts when walking alone. If you own a gun, recognize the dangers of having a gun in your home. Use safety precautions at all times.  Don't smoke, and quit if you do. Ask your health care  provider for help. [facility] offers a smoking cessation program.  If you drink alcohol, drink in moderation. Never drink before or when driving.   Ask someone you trust for help if you think you might be addicted to drugs or alcohol.  Help prevent sexually transmitted infections (STIs) and HIV/AIDS by using condoms every time you have sexual contact. Keep in mind, condoms are not 100 percent foolproof, so discuss STI screening with your provider. Birth control methods other than condoms, such as pills and implants, won't protect you from STIs or HIV.  Brush your teeth after meals with a soft or medium bristled toothbrush. Also brush after drinking, before going to bed. Use dental floss daily.  Stay out of the sun, especially between 10 a.m. and 3 p.m. when the sun's harmful rays are strongest. Don't think you are safe if it is cloudy or if you are in the water, as harmful rays pass through both. Use a broad spectrum sunscreen that guards against both UVA and UVB rays, with a sun protection factor (SPF) of 15 or higher. Select sunglasses that block 99 to 100 percent of the sun's rays.  Learn to recognize and manage stress in your life. Signs of stress include trouble sleeping, frequent headaches and stomach problems; being angry a lot; and turning to food, drugs and alcohol to relieve stress. Good ways to deal with stress include regular exercise, healthy eating habits, and relaxation exercises such as deep breathing or meditation. Talking to trusted family members and friends can help a lot. Some women find that interacting with their torri community is helpful in times of stress. Get enough sleep and rest - adults need around eight hours of sleep a night. Talk to your health care provider if you feel depressed for more than a few days. Depression is a treatable illness. Signs of depression include feeling empty and sad, crying a lot, loss of interest in life, and thoughts of death or suicide. If you or someone you know  has thoughts of suicide, get help right away. Call 911, a local crisis center or (800) SUICIDE.   Follow up in about 3 months (around 10/17/2023) for DM, HTN.     Signature:  WADE Means    Date of encounter: 7/17/23

## 2023-07-18 ENCOUNTER — PATIENT OUTREACH (OUTPATIENT)
Dept: ADMINISTRATIVE | Facility: HOSPITAL | Age: 53
End: 2023-07-18

## 2023-07-18 NOTE — PROGRESS NOTES
Post visit Population Health review of encounter with date of service  7/17 with Naye.  All required HY components in encounter.However, no wellness plan was documented. Sent note to provider pool to add wellness plan if completed at visit.    Followup appt for: 10/20/2023

## 2023-07-24 ENCOUNTER — TELEPHONE (OUTPATIENT)
Dept: FAMILY MEDICINE | Facility: CLINIC | Age: 53
End: 2023-07-24
Payer: COMMERCIAL

## 2023-07-24 NOTE — TELEPHONE ENCOUNTER
----- Message from WADE Means sent at 7/21/2023  7:31 PM CDT -----  Please call Mrs. Olguin and let her know her A1C has improved tremendously. She went from 10.3 in March to 7.1. Her average blood sugar is 150. Her Triglycerides have improved but are still above normal range. She is at 219 and they need to be below 150. Her cholesterol, however, has decreased and is within normal range. She can continue current medications, make sure she is drinking plenty of water, and continue to cut back on pre-packaged meals, sandwich meats, and processed meats such as sausage and diaz. No other changes need to be made at this time.

## 2023-08-02 DIAGNOSIS — Z12.31 SCREENING MAMMOGRAM, ENCOUNTER FOR: Primary | ICD-10-CM

## 2023-10-23 ENCOUNTER — PATIENT OUTREACH (OUTPATIENT)
Dept: ADMINISTRATIVE | Facility: HOSPITAL | Age: 53
End: 2023-10-23

## 2023-10-23 NOTE — PROGRESS NOTES
Population Health review of encounter with date of service 10/20/23.  Does not qualify for HY or [facility].  Has HY scheduled for 07/18/2024. Magui

## 2023-11-12 DIAGNOSIS — E11.65 UNCONTROLLED TYPE 2 DIABETES MELLITUS WITH HYPERGLYCEMIA: ICD-10-CM

## 2023-11-13 RX ORDER — METFORMIN HYDROCHLORIDE 500 MG/1
TABLET ORAL
Qty: 270 TABLET | Refills: 0 | Status: SHIPPED | OUTPATIENT
Start: 2023-11-13

## 2024-01-05 ENCOUNTER — OFFICE VISIT (OUTPATIENT)
Dept: FAMILY MEDICINE | Facility: CLINIC | Age: 54
End: 2024-01-05
Payer: COMMERCIAL

## 2024-01-05 VITALS
HEART RATE: 76 BPM | TEMPERATURE: 98 F | DIASTOLIC BLOOD PRESSURE: 86 MMHG | BODY MASS INDEX: 30.69 KG/M2 | HEIGHT: 63 IN | OXYGEN SATURATION: 97 % | SYSTOLIC BLOOD PRESSURE: 127 MMHG | WEIGHT: 173.19 LBS

## 2024-01-05 DIAGNOSIS — E11.9 TYPE 2 DIABETES MELLITUS WITHOUT COMPLICATION, WITHOUT LONG-TERM CURRENT USE OF INSULIN: Primary | ICD-10-CM

## 2024-01-05 LAB
EST. AVERAGE GLUCOSE BLD GHB EST-MCNC: 134 MG/DL
HBA1C MFR BLD HPLC: 6.3 % (ref 4.5–6.6)

## 2024-01-05 PROCEDURE — 99212 OFFICE O/P EST SF 10 MIN: CPT | Mod: ,,, | Performed by: FAMILY MEDICINE

## 2024-01-05 PROCEDURE — 3044F HG A1C LEVEL LT 7.0%: CPT | Mod: ,,, | Performed by: FAMILY MEDICINE

## 2024-01-05 PROCEDURE — 3079F DIAST BP 80-89 MM HG: CPT | Mod: ,,, | Performed by: FAMILY MEDICINE

## 2024-01-05 PROCEDURE — 83036 HEMOGLOBIN GLYCOSYLATED A1C: CPT | Mod: ,,, | Performed by: CLINICAL MEDICAL LABORATORY

## 2024-01-05 PROCEDURE — 3074F SYST BP LT 130 MM HG: CPT | Mod: ,,, | Performed by: FAMILY MEDICINE

## 2024-01-05 PROCEDURE — 3008F BODY MASS INDEX DOCD: CPT | Mod: ,,, | Performed by: FAMILY MEDICINE

## 2024-01-05 NOTE — PROGRESS NOTES
Lizzy Hernandez DO        PATIENT NAME: Alisa Olguin  : 1970  DATE: 24  MRN: [id number]      Reason for Visit / Chief Complaint: Procedure (Patient needs A1C checked before she has surgery.)     History of Present Illness / Problem Focused Workflow     Alisa Olguin presents to the clinic with Procedure (Patient needs A1C checked before she has surgery.)     Presents here for an hemoglobin A1c for dental procedure   Is needing dental implants    Does have T2 DM and is currently on Trulicity and Metformin   Reports checking her blood sugars daily and states they range from 110-120s with the lowest being in 80s   Denies any hypoglycemic episodes   Last A1c done in 2023 was 7.1 %           Review of Systems     Review of Systems   Constitutional:  Negative for chills and fever.   Respiratory:  Negative for shortness of breath.    Cardiovascular:  Negative for chest pain and palpitations.   Gastrointestinal:  Negative for abdominal pain, nausea and vomiting.        Medical / Social / Family History     Past Medical History:   Diagnosis Date    Diabetes mellitus, type 2     Hyperlipidemia     Hypertension        Past Surgical History:   Procedure Laterality Date    TUBAL LIGATION         Social History  Ms. Alisa Olguin  reports that she has never smoked. She has never been exposed to tobacco smoke. She has never used smokeless tobacco. She reports that she does not currently use alcohol. She reports that she does not use drugs.    Family History  Ms. Alisa Olguin's family history is not on file.    Medications and Allergies     Medications  Outpatient Medications Marked as Taking for the 24 encounter (Office Visit) with Lizzy Hernandez DO   Medication Sig Dispense Refill    dulaglutide (TRULICITY) 0.75 mg/0.5 mL pen injector Inject 1.5 mg into the skin every 7 days. 24 pen 1    gabapentin (NEURONTIN) 800 MG tablet Take 1 tablet (800 mg total) by mouth 3 (three) times  daily. 270 tablet 1    lisinopriL-hydrochlorothiazide (PRINZIDE,ZESTORETIC) 20-12.5 mg per tablet Take 1 tablet by mouth once daily. 90 tablet 1    metFORMIN (GLUCOPHAGE) 500 MG tablet TAKE 1 & 1/2 (ONE & ONE-HALF) TABLETS BY MOUTH IN THE MORNING AND 1 & 1/2 (ONE & ONE-HALF) NIGHTLY 270 tablet 0    rosuvastatin (CRESTOR) 20 MG tablet Take 1 tablet (20 mg total) by mouth once daily. 90 tablet 1       Allergies  Review of patient's allergies indicates:  No Known Allergies    Physical Examination   There were no vitals filed for this visit.  Physical Exam  Constitutional:       General: She is not in acute distress.     Appearance: Normal appearance.   HENT:      Head: Normocephalic and atraumatic.   Cardiovascular:      Rate and Rhythm: Normal rate and regular rhythm.      Pulses: Normal pulses.      Heart sounds: No murmur heard.  Pulmonary:      Effort: Pulmonary effort is normal.      Breath sounds: Normal breath sounds. No wheezing, rhonchi or rales.   Neurological:      Mental Status: She is alert.          Assessment and Plan (including Health Maintenance)     Plan:   Type 2 diabetes mellitus without complication, without long-term current use of insulin  - Continue current medications   - Will obtain repeat A1c   - Follow up in 6 months for T2 DM follow up      Signature:  Lizzy Hernandez DO      Date of encounter: 1/5/24

## 2024-02-20 DIAGNOSIS — I10 ESSENTIAL HYPERTENSION: ICD-10-CM

## 2024-02-20 RX ORDER — LISINOPRIL AND HYDROCHLOROTHIAZIDE 12.5; 2 MG/1; MG/1
1 TABLET ORAL DAILY
Qty: 90 TABLET | Refills: 1 | Status: SHIPPED | OUTPATIENT
Start: 2024-02-20 | End: 2024-08-18

## 2024-03-13 DIAGNOSIS — E11.65 UNCONTROLLED TYPE 2 DIABETES MELLITUS WITH HYPERGLYCEMIA: ICD-10-CM

## 2024-03-20 RX ORDER — DULAGLUTIDE 1.5 MG/.5ML
INJECTION, SOLUTION SUBCUTANEOUS
Qty: 4 PEN | Refills: 0 | Status: SHIPPED | OUTPATIENT
Start: 2024-03-20

## 2024-05-07 DIAGNOSIS — E78.5 HYPERLIPIDEMIA, UNSPECIFIED HYPERLIPIDEMIA TYPE: ICD-10-CM

## 2024-05-07 DIAGNOSIS — E11.65 UNCONTROLLED TYPE 2 DIABETES MELLITUS WITH HYPERGLYCEMIA: ICD-10-CM

## 2024-05-07 RX ORDER — ROSUVASTATIN CALCIUM 20 MG/1
20 TABLET, COATED ORAL DAILY
Qty: 90 TABLET | Refills: 3 | Status: SHIPPED | OUTPATIENT
Start: 2024-05-07

## 2024-05-07 RX ORDER — METFORMIN HYDROCHLORIDE 500 MG/1
750 TABLET ORAL 2 TIMES DAILY WITH MEALS
Qty: 270 TABLET | Refills: 3 | Status: SHIPPED | OUTPATIENT
Start: 2024-05-07

## 2024-07-17 DIAGNOSIS — G62.9 NEUROPATHY: Primary | ICD-10-CM

## 2024-07-18 ENCOUNTER — TELEPHONE (OUTPATIENT)
Dept: FAMILY MEDICINE | Facility: CLINIC | Age: 54
End: 2024-07-18
Payer: COMMERCIAL

## 2024-07-18 RX ORDER — GABAPENTIN 800 MG/1
800 TABLET ORAL 3 TIMES DAILY
Qty: 90 TABLET | Refills: 0 | Status: SHIPPED | OUTPATIENT
Start: 2024-07-18

## 2024-07-22 ENCOUNTER — TELEPHONE (OUTPATIENT)
Dept: FAMILY MEDICINE | Facility: CLINIC | Age: 54
End: 2024-07-22
Payer: COMMERCIAL

## 2024-07-22 NOTE — TELEPHONE ENCOUNTER
Tried multiple times this week to verify dosage of gabapentin per Dr. Hernandez's request. Unable to get in touch with patient today or multiple times last week.

## 2024-08-23 LAB
LEFT EYE DM RETINOPATHY: NEGATIVE
RIGHT EYE DM RETINOPATHY: NEGATIVE

## 2024-08-26 ENCOUNTER — OFFICE VISIT (OUTPATIENT)
Dept: FAMILY MEDICINE | Facility: CLINIC | Age: 54
End: 2024-08-26
Payer: COMMERCIAL

## 2024-08-26 VITALS
WEIGHT: 164 LBS | TEMPERATURE: 98 F | SYSTOLIC BLOOD PRESSURE: 123 MMHG | BODY MASS INDEX: 29.06 KG/M2 | HEART RATE: 67 BPM | RESPIRATION RATE: 18 BRPM | DIASTOLIC BLOOD PRESSURE: 84 MMHG | OXYGEN SATURATION: 97 % | HEIGHT: 63 IN

## 2024-08-26 DIAGNOSIS — E11.65 UNCONTROLLED TYPE 2 DIABETES MELLITUS WITH HYPERGLYCEMIA: Primary | ICD-10-CM

## 2024-08-26 DIAGNOSIS — I10 ESSENTIAL HYPERTENSION: ICD-10-CM

## 2024-08-26 DIAGNOSIS — Z12.11 SCREENING FOR COLON CANCER: ICD-10-CM

## 2024-08-26 DIAGNOSIS — D51.9 ANEMIA DUE TO VITAMIN B12 DEFICIENCY, UNSPECIFIED B12 DEFICIENCY TYPE: ICD-10-CM

## 2024-08-26 DIAGNOSIS — E78.5 HYPERLIPIDEMIA, UNSPECIFIED HYPERLIPIDEMIA TYPE: ICD-10-CM

## 2024-08-26 DIAGNOSIS — Z12.31 ENCOUNTER FOR SCREENING MAMMOGRAM FOR MALIGNANT NEOPLASM OF BREAST: ICD-10-CM

## 2024-08-26 PROCEDURE — 1159F MED LIST DOCD IN RCRD: CPT | Mod: ,,, | Performed by: NURSE PRACTITIONER

## 2024-08-26 PROCEDURE — 4010F ACE/ARB THERAPY RXD/TAKEN: CPT | Mod: ,,, | Performed by: NURSE PRACTITIONER

## 2024-08-26 PROCEDURE — 82570 ASSAY OF URINE CREATININE: CPT | Mod: ,,, | Performed by: CLINICAL MEDICAL LABORATORY

## 2024-08-26 PROCEDURE — 1160F RVW MEDS BY RX/DR IN RCRD: CPT | Mod: ,,, | Performed by: NURSE PRACTITIONER

## 2024-08-26 PROCEDURE — 3079F DIAST BP 80-89 MM HG: CPT | Mod: ,,, | Performed by: NURSE PRACTITIONER

## 2024-08-26 PROCEDURE — 80061 LIPID PANEL: CPT | Mod: ,,, | Performed by: CLINICAL MEDICAL LABORATORY

## 2024-08-26 PROCEDURE — 85025 COMPLETE CBC W/AUTO DIFF WBC: CPT | Mod: ,,, | Performed by: CLINICAL MEDICAL LABORATORY

## 2024-08-26 PROCEDURE — 3074F SYST BP LT 130 MM HG: CPT | Mod: ,,, | Performed by: NURSE PRACTITIONER

## 2024-08-26 PROCEDURE — 3044F HG A1C LEVEL LT 7.0%: CPT | Mod: ,,, | Performed by: NURSE PRACTITIONER

## 2024-08-26 PROCEDURE — 82043 UR ALBUMIN QUANTITATIVE: CPT | Mod: ,,, | Performed by: CLINICAL MEDICAL LABORATORY

## 2024-08-26 PROCEDURE — 99213 OFFICE O/P EST LOW 20 MIN: CPT | Mod: ,,, | Performed by: NURSE PRACTITIONER

## 2024-08-26 PROCEDURE — 87077 CULTURE AEROBIC IDENTIFY: CPT | Mod: ,,, | Performed by: CLINICAL MEDICAL LABORATORY

## 2024-08-26 PROCEDURE — 3008F BODY MASS INDEX DOCD: CPT | Mod: ,,, | Performed by: NURSE PRACTITIONER

## 2024-08-26 PROCEDURE — 81001 URINALYSIS AUTO W/SCOPE: CPT | Mod: ,,, | Performed by: CLINICAL MEDICAL LABORATORY

## 2024-08-26 PROCEDURE — 87086 URINE CULTURE/COLONY COUNT: CPT | Mod: ,,, | Performed by: CLINICAL MEDICAL LABORATORY

## 2024-08-26 PROCEDURE — 80053 COMPREHEN METABOLIC PANEL: CPT | Mod: ,,, | Performed by: CLINICAL MEDICAL LABORATORY

## 2024-08-26 PROCEDURE — 83036 HEMOGLOBIN GLYCOSYLATED A1C: CPT | Mod: ,,, | Performed by: CLINICAL MEDICAL LABORATORY

## 2024-08-26 RX ORDER — LISINOPRIL AND HYDROCHLOROTHIAZIDE 12.5; 2 MG/1; MG/1
1 TABLET ORAL DAILY
Qty: 90 TABLET | Refills: 1 | Status: SHIPPED | OUTPATIENT
Start: 2024-08-26 | End: 2025-02-22

## 2024-08-26 NOTE — PROGRESS NOTES
WADE Ding   [place] [address] [place]  [address]     PATIENT NAME: Alisa Olguin  : 1970  DATE: 24  MRN: [id number]      Billing Provider: WADE Ding  Level of Service: MD OFFICE/OUTPT VISIT, EST, LEVL III, 20-29 MIN  Patient PCP Information       Provider PCP Type    Lizzy Hernandez DO General            Reason for Visit / Chief Complaint: Hypertension (Needs refill on lisinopril)       Update PCP  Update Chief Complaint         History of Present Illness / Problem Focused Workflow     Alisa Olguin presents to the clinic with Hypertension (Needs refill on lisinopril)     54-year-old female presents for refill lisinopril, routine labs.  Is diabetic, has been out of her Trulicity until a couple of days ago.  Was out for about 2 months.  Used her friend's mounjaro for 1 month of that gap, so has been completely off of injectable for about 4 weeks within the last 3-4 months.  Feels like the mounjaro worked better for her.  No complaints today.    Hypertension        Review of Systems     Review of Systems   Constitutional: Negative.    HENT: Negative.     Eyes: Negative.    Respiratory: Negative.     Cardiovascular: Negative.    Gastrointestinal: Negative.    Endocrine: Negative.    Musculoskeletal: Negative.    Integumentary:  Negative.   Neurological: Negative.    Psychiatric/Behavioral: Negative.     All other systems reviewed and are negative.       Medical / Social / Family History     Past Medical History:   Diagnosis Date    Diabetes mellitus, type 2     Hyperlipidemia     Hypertension        Past Surgical History:   Procedure Laterality Date    TUBAL LIGATION         Social History  Ms. Olguin  reports that she has never smoked. She has never been exposed to tobacco smoke. She has never used smokeless tobacco. She reports that she does not currently use alcohol. She reports that she does not use drugs.    Family History  Ms.'s Olguin family  history includes Hypertension in her mother.    Medications and Allergies     Medications  Outpatient Medications Marked as Taking for the 8/26/24 encounter (Office Visit) with Lou Cárdenas FNP   Medication Sig Dispense Refill    dulaglutide (TRULICITY) 1.5 mg/0.5 mL pen injector INJECT 1.5MG SUB-Q EVERY WEEK 4 Pen 0    gabapentin (NEURONTIN) 800 MG tablet Take 1 tablet (800 mg total) by mouth 3 (three) times daily. 90 tablet 0    metFORMIN (GLUCOPHAGE) 500 MG tablet Take 1.5 tablets (750 mg total) by mouth 2 (two) times daily with meals. 270 tablet 3    rosuvastatin (CRESTOR) 20 MG tablet Take 1 tablet (20 mg total) by mouth once daily. 90 tablet 3    [DISCONTINUED] lisinopriL-hydrochlorothiazide (PRINZIDE,ZESTORETIC) 20-12.5 mg per tablet Take 1 tablet by mouth once daily. 90 tablet 1       Allergies  Review of patient's allergies indicates:  No Known Allergies    Physical Examination     Vitals:    08/26/24 1611   BP: 123/84   Pulse: 67   Resp: 18   Temp: 98.4 °F (36.9 °C)     Physical Exam  Vitals and nursing note reviewed.   Constitutional:       Appearance: Normal appearance.   HENT:      Head: Normocephalic and atraumatic.      Mouth/Throat:      Mouth: Mucous membranes are moist.      Pharynx: Oropharynx is clear.   Eyes:      Extraocular Movements: Extraocular movements intact.      Conjunctiva/sclera: Conjunctivae normal.      Pupils: Pupils are equal, round, and reactive to light.   Cardiovascular:      Rate and Rhythm: Normal rate and regular rhythm.      Pulses: Normal pulses.      Heart sounds: Normal heart sounds.   Pulmonary:      Effort: Pulmonary effort is normal.      Breath sounds: Normal breath sounds.   Abdominal:      General: Abdomen is flat. Bowel sounds are normal.      Palpations: Abdomen is soft.   Musculoskeletal:         General: Normal range of motion.      Cervical back: Normal range of motion and neck supple.   Skin:     General: Skin is warm and dry.      Capillary Refill:  Capillary refill takes less than 2 seconds.   Neurological:      General: No focal deficit present.      Mental Status: She is alert and oriented to person, place, and time. Mental status is at baseline.   Psychiatric:         Mood and Affect: Mood normal.         Behavior: Behavior normal.         Thought Content: Thought content normal.         Judgment: Judgment normal.              Assessment and Plan (including Health Maintenance)      Problem List  Smart Sets  Document Outside HM   :    Plan:   Uncontrolled type 2 diabetes mellitus with hyperglycemia  -     Comprehensive Metabolic Panel; Future; Expected date: 08/26/2024  -     Hemoglobin A1C; Future; Expected date: 08/26/2024  -     Microalbumin/Creatinine Ratio, Urine; Future; Expected date: 08/26/2024  -     Urinalysis, Reflex to Urine Culture; Future; Expected date: 08/26/2024    Essential hypertension  -     CBC Auto Differential; Future; Expected date: 08/26/2024  -     Urinalysis, Reflex to Urine Culture; Future; Expected date: 08/26/2024  -     lisinopriL-hydrochlorothiazide (PRINZIDE,ZESTORETIC) 20-12.5 mg per tablet; Take 1 tablet by mouth once daily.  Dispense: 90 tablet; Refill: 1    Hyperlipidemia, unspecified hyperlipidemia type  -     Lipid Panel; Future; Expected date: 08/26/2024    Anemia due to vitamin B12 deficiency, unspecified B12 deficiency type  -     CBC Auto Differential; Future; Expected date: 08/26/2024    Encounter for screening mammogram for malignant neoplasm of breast  -     Mammo Digital Screening Bilat; Future; Expected date: 08/26/2024    Screening for colon cancer  -     Cologuard Screening (Multitarget Stool DNA); Future; Expected date: 08/26/2024             Total time spent     Face to face encounter time 8 minutes.     Non face to face encounter time 12 minutes.  Reviewing separate obtained history, counseling and educating the patient, family and/or other caregivers, ordering medications, tests or procedures; referring  and communicating with other health care professionals; documenting clinical information in the electronic medical record; care coordination; independently interpreting results and communicating results to the patient, family, and/or caregivers.     Total time for visit 20 minutes      Health Maintenance Due   Topic Date Due    Pneumococcal Vaccines (Age 0-64) (1 of 2 - PCV) Never done    Eye Exam  Never done    TETANUS VACCINE  Never done    Colorectal Cancer Screening  Never done    Shingles Vaccine (1 of 2) Never done    Foot Exam  04/13/2023    Mammogram  08/19/2023    COVID-19 Vaccine (1 - 2023-24 season) Never done    Hemoglobin A1c  04/05/2024    Diabetes Urine Screening  07/17/2024    Lipid Panel  07/17/2024    Cervical Cancer Screening  07/12/2024       Problem List Items Addressed This Visit          Cardiac/Vascular    Essential hypertension    Relevant Medications    lisinopriL-hydrochlorothiazide (PRINZIDE,ZESTORETIC) 20-12.5 mg per tablet    Other Relevant Orders    CBC Auto Differential    Urinalysis, Reflex to Urine Culture    Hyperlipidemia    Relevant Orders    Lipid Panel       Endocrine    Uncontrolled type 2 diabetes mellitus with hyperglycemia - Primary    Relevant Orders    Comprehensive Metabolic Panel    Hemoglobin A1C    Microalbumin/Creatinine Ratio, Urine    Urinalysis, Reflex to Urine Culture     Other Visit Diagnoses       Anemia due to vitamin B12 deficiency, unspecified B12 deficiency type        Relevant Orders    CBC Auto Differential    Encounter for screening mammogram for malignant neoplasm of breast        Relevant Orders    Mammo Digital Screening Bilat    Screening for colon cancer        Relevant Orders    Cologuard Screening (Multitarget Stool DNA)            Health Maintenance Topics with due status: Not Due       Topic Last Completion Date    Influenza Vaccine 11/14/2022    Low Dose Statin 05/07/2024       Future Appointments   Date Time Provider Department Center    11/18/2024  4:00 PM Lizzy Hernandez, DO [facility]        There are no Patient Instructions on file for this visit.  Follow up in about 3 months (around 11/26/2024) for Routine A1C, refills.     Signature:  WADE Ding      Date of encounter: 8/26/24

## 2024-08-27 ENCOUNTER — PATIENT OUTREACH (OUTPATIENT)
Facility: HOSPITAL | Age: 54
End: 2024-08-27
Payer: COMMERCIAL

## 2024-08-27 LAB
ALBUMIN SERPL BCP-MCNC: 4.1 G/DL (ref 3.5–5)
ALBUMIN/GLOB SERPL: 1.2 {RATIO}
ALP SERPL-CCNC: 90 U/L (ref 46–118)
ALT SERPL W P-5'-P-CCNC: 23 U/L (ref 13–56)
ANION GAP SERPL CALCULATED.3IONS-SCNC: 9 MMOL/L (ref 7–16)
AST SERPL W P-5'-P-CCNC: 11 U/L (ref 15–37)
BACTERIA #/AREA URNS HPF: ABNORMAL /HPF
BASOPHILS # BLD AUTO: 0.04 K/UL (ref 0–0.2)
BASOPHILS NFR BLD AUTO: 0.5 % (ref 0–1)
BILIRUB SERPL-MCNC: 0.7 MG/DL (ref ?–1.2)
BILIRUB UR QL STRIP: NEGATIVE
BUN SERPL-MCNC: 18 MG/DL (ref 7–18)
BUN/CREAT SERPL: 19 (ref 6–20)
CALCIUM SERPL-MCNC: 9.3 MG/DL (ref 8.5–10.1)
CHLORIDE SERPL-SCNC: 106 MMOL/L (ref 98–107)
CHOLEST SERPL-MCNC: 160 MG/DL (ref 0–200)
CHOLEST/HDLC SERPL: 3.5 {RATIO}
CLARITY UR: CLEAR
CO2 SERPL-SCNC: 28 MMOL/L (ref 21–32)
COLOR UR: ABNORMAL
CREAT SERPL-MCNC: 0.97 MG/DL (ref 0.55–1.02)
CREAT UR-MCNC: 74 MG/DL (ref 28–219)
DIFFERENTIAL METHOD BLD: ABNORMAL
EGFR (NO RACE VARIABLE) (RUSH/TITUS): 69 ML/MIN/1.73M2
EOSINOPHIL # BLD AUTO: 0.27 K/UL (ref 0–0.5)
EOSINOPHIL NFR BLD AUTO: 3.4 % (ref 1–4)
ERYTHROCYTE [DISTWIDTH] IN BLOOD BY AUTOMATED COUNT: 12 % (ref 11.5–14.5)
EST. AVERAGE GLUCOSE BLD GHB EST-MCNC: 134 MG/DL
GLOBULIN SER-MCNC: 3.5 G/DL (ref 2–4)
GLUCOSE SERPL-MCNC: 132 MG/DL (ref 74–106)
GLUCOSE UR STRIP-MCNC: NORMAL MG/DL
HBA1C MFR BLD HPLC: 6.3 % (ref 4.5–6.6)
HCT VFR BLD AUTO: 41.1 % (ref 38–47)
HDLC SERPL-MCNC: 46 MG/DL (ref 40–60)
HGB BLD-MCNC: 13.9 G/DL (ref 12–16)
IMM GRANULOCYTES # BLD AUTO: 0.02 K/UL (ref 0–0.04)
IMM GRANULOCYTES NFR BLD: 0.3 % (ref 0–0.4)
KETONES UR STRIP-SCNC: NEGATIVE MG/DL
LDLC SERPL CALC-MCNC: 64 MG/DL
LDLC/HDLC SERPL: 1.4 {RATIO}
LEUKOCYTE ESTERASE UR QL STRIP: ABNORMAL
LYMPHOCYTES # BLD AUTO: 2.58 K/UL (ref 1–4.8)
LYMPHOCYTES NFR BLD AUTO: 32.5 % (ref 27–41)
MCH RBC QN AUTO: 31.4 PG (ref 27–31)
MCHC RBC AUTO-ENTMCNC: 33.8 G/DL (ref 32–36)
MCV RBC AUTO: 92.8 FL (ref 80–96)
MICROALBUMIN UR-MCNC: 4.2 MG/DL (ref 0–2.8)
MICROALBUMIN/CREAT RATIO PNL UR: 56.8 MG/G (ref 0–30)
MONOCYTES # BLD AUTO: 0.58 K/UL (ref 0–0.8)
MONOCYTES NFR BLD AUTO: 7.3 % (ref 2–6)
MPC BLD CALC-MCNC: 9.9 FL (ref 9.4–12.4)
MUCOUS, UA: ABNORMAL /LPF
NEUTROPHILS # BLD AUTO: 4.46 K/UL (ref 1.8–7.7)
NEUTROPHILS NFR BLD AUTO: 56 % (ref 53–65)
NITRITE UR QL STRIP: NEGATIVE
NONHDLC SERPL-MCNC: 114 MG/DL
NRBC # BLD AUTO: 0 X10E3/UL
NRBC, AUTO (.00): 0 %
PH UR STRIP: 5 PH UNITS
PLATELET # BLD AUTO: 310 K/UL (ref 150–400)
POTASSIUM SERPL-SCNC: 3.6 MMOL/L (ref 3.5–5.1)
PROT SERPL-MCNC: 7.6 G/DL (ref 6.4–8.2)
PROT UR QL STRIP: 10
RBC # BLD AUTO: 4.43 M/UL (ref 4.2–5.4)
RBC # UR STRIP: NEGATIVE /UL
RBC #/AREA URNS HPF: 2 /HPF
SODIUM SERPL-SCNC: 139 MMOL/L (ref 136–145)
SP GR UR STRIP: 1.01
SQUAMOUS #/AREA URNS LPF: ABNORMAL /HPF
TRANS CELLS #/AREA URNS LPF: ABNORMAL /LPF
TRIGL SERPL-MCNC: 249 MG/DL (ref 35–150)
UROBILINOGEN UR STRIP-ACNC: NORMAL MG/DL
VLDLC SERPL-MCNC: 50 MG/DL
WBC # BLD AUTO: 7.95 K/UL (ref 4.5–11)
WBC #/AREA URNS HPF: 42 /HPF

## 2024-08-27 NOTE — LETTER
AUTHORIZATION FOR RELEASE OF   CONFIDENTIAL INFORMATION    Dear [facility],    We are seeing Alisa Olguin, date of birth 1970, in the clinic at [facility]. Lou Cárdenas FNP is the patient's PCP. Alisa Olguin has an outstanding lab/procedure at the time we reviewed her chart. In order to help keep her health information updated, she has authorized us to request the following medical record(s):        (  )  MAMMOGRAM                                      (  )  COLONOSCOPY      (  )  PAP SMEAR                                          (  )  OUTSIDE LAB RESULTS     (  )  DEXA SCAN                                          (X)  EYE EXAM            (  )  FOOT EXAM                                          (  )  ENTIRE RECORD     (  )  OUTSIDE IMMUNIZATIONS                 (  )  _______________         Please fax records to [facility] Care Coordinator, Faith Vera, [phone].     If you have any questions, please contact [phone].          Patient Name: Alisa Olguin  : 1970  Patient Phone #: [phone]

## 2024-08-29 DIAGNOSIS — B95.1 GROUP B STREPTOCOCCAL UTI: Primary | ICD-10-CM

## 2024-08-29 DIAGNOSIS — N39.0 GROUP B STREPTOCOCCAL UTI: Primary | ICD-10-CM

## 2024-08-29 LAB — UA COMPLETE W REFLEX CULTURE PNL UR: ABNORMAL

## 2024-08-29 RX ORDER — AMOXICILLIN AND CLAVULANATE POTASSIUM 500; 125 MG/1; MG/1
1 TABLET, FILM COATED ORAL 2 TIMES DAILY
Qty: 14 TABLET | Refills: 0 | Status: SHIPPED | OUTPATIENT
Start: 2024-08-29 | End: 2024-09-05

## 2024-09-13 DIAGNOSIS — Z12.11 SCREENING FOR COLON CANCER: Primary | ICD-10-CM

## 2024-09-16 ENCOUNTER — PATIENT OUTREACH (OUTPATIENT)
Facility: HOSPITAL | Age: 54
End: 2024-09-16
Payer: COMMERCIAL

## 2024-10-02 ENCOUNTER — PATIENT OUTREACH (OUTPATIENT)
Facility: HOSPITAL | Age: 54
End: 2024-10-02
Payer: COMMERCIAL

## 2024-11-26 ENCOUNTER — OFFICE VISIT (OUTPATIENT)
Dept: FAMILY MEDICINE | Facility: CLINIC | Age: 54
End: 2024-11-26
Payer: COMMERCIAL

## 2024-11-26 VITALS
BODY MASS INDEX: 28.88 KG/M2 | OXYGEN SATURATION: 98 % | RESPIRATION RATE: 18 BRPM | TEMPERATURE: 99 F | HEIGHT: 63 IN | SYSTOLIC BLOOD PRESSURE: 113 MMHG | HEART RATE: 80 BPM | WEIGHT: 163 LBS | DIASTOLIC BLOOD PRESSURE: 81 MMHG

## 2024-11-26 DIAGNOSIS — Z00.00 ROUTINE GENERAL MEDICAL EXAMINATION AT A HEALTH CARE FACILITY: ICD-10-CM

## 2024-11-26 DIAGNOSIS — Z12.4 SCREENING FOR CERVICAL CANCER: Primary | ICD-10-CM

## 2024-11-26 DIAGNOSIS — E78.5 HYPERLIPIDEMIA, UNSPECIFIED HYPERLIPIDEMIA TYPE: ICD-10-CM

## 2024-11-26 DIAGNOSIS — I10 ESSENTIAL HYPERTENSION: ICD-10-CM

## 2024-11-26 DIAGNOSIS — G62.9 NEUROPATHY: ICD-10-CM

## 2024-11-26 PROCEDURE — 87624 HPV HI-RISK TYP POOLED RSLT: CPT | Mod: ,,, | Performed by: CLINICAL MEDICAL LABORATORY

## 2024-11-26 PROCEDURE — 88142 CYTOPATH C/V THIN LAYER: CPT | Mod: TC,GCY | Performed by: NURSE PRACTITIONER

## 2024-11-26 RX ORDER — GABAPENTIN 800 MG/1
800 TABLET ORAL 3 TIMES DAILY
Qty: 90 TABLET | Refills: 0 | Status: SHIPPED | OUTPATIENT
Start: 2024-11-26

## 2024-11-26 RX ORDER — ROSUVASTATIN CALCIUM 20 MG/1
20 TABLET, COATED ORAL NIGHTLY
Qty: 90 TABLET | Refills: 3 | Status: SHIPPED | OUTPATIENT
Start: 2024-11-26

## 2024-11-27 ENCOUNTER — PATIENT OUTREACH (OUTPATIENT)
Facility: HOSPITAL | Age: 54
End: 2024-11-27
Payer: COMMERCIAL

## 2024-11-27 RX ORDER — LISINOPRIL AND HYDROCHLOROTHIAZIDE 12.5; 2 MG/1; MG/1
1 TABLET ORAL DAILY
Qty: 90 TABLET | Refills: 1 | Status: SHIPPED | OUTPATIENT
Start: 2024-11-27 | End: 2025-05-26

## 2024-11-27 NOTE — PROGRESS NOTES
Population Health Chart Review & Patient Outreach Details      Further Action Needed If Patient Returns Outreach:      Reviewed 24 chart is opened.      Updates Requested / Reviewed:     []  Care Everywhere    []     []  External Sources ([facility], [facility], [facility], etc.)    [] [facility]   [] [facility]   [] Other:    []  Care Team Updated   []  Removed  or Duplicate Orders   []  Immunization Reconciliation Completed / Queried    [] [place]   [] [place]   [] [place]   [] [place]      Health Maintenance Topics Addressed and Outreach Outcomes / Actions Taken:             Breast Cancer Screening []  Mammogram Order Placed    []  Mammogram Screening Scheduled    []  External Records Requested & Care Team Updated if Applicable    []  External Records Uploaded & Care Team Updated if Applicable    []  Pt Declined Scheduling Mammogram    []  Pt Will Schedule with External Provider / Order Routed & Care Team Updated if Applicable              Cervical Cancer Screening []  Pap Smear Scheduled in Primary Care or OBGYN    []  External Records Requested & Care Team Updated if Applicable       []  External Records Uploaded, Care Team Updated, & History Updated if Applicable    []  Patient Declined Scheduling Pap Smear    []  Patient Will Schedule with External Provider & Care Team Updated if Applicable                  Colorectal Cancer Screening []  Colonoscopy Case Request / Referral / Home Test Order Placed    []  External Records Requested & Care Team Updated if Applicable    []  External Records Uploaded, Care Team Updated, & History Updated if Applicable    []  Patient Declined Completing Colon Cancer Screening    []  Patient Will Schedule with External Provider & Care Team Updated if Applicable    []  Fit Kit Mailed (add the SmartPhrase under additional notes)    []  Reminded Patient to Complete Home Test                Diabetic Eye Exam []  Eye Exam Screening Order Placed    []  Eye Camera Scheduled or  Optometry/Ophthalmology Referral Placed    []  External Records Requested & Care Team Updated if Applicable    []  External Records Uploaded, Care Team Updated, & History Updated if Applicable    []  Patient Declined Scheduling Eye Exam    []  Patient Will Schedule with External Provider & Care Team Updated if Applicable             Blood Pressure Control []  Primary Care Follow Up Visit Scheduled     []  Remote Blood Pressure Reading Captured    []  Patient Declined Remote Reading or Scheduling Appt - Escalated to PCP    []  Patient Will Call Back or Send Portal Message with Reading                 HbA1c & Other Labs []  Overdue Lab(s) Ordered    []  Overdue Lab(s) Scheduled    []  External Records Uploaded & Care Team Updated if Applicable    []  Primary Care Follow Up Visit Scheduled     []  Reminded Patient to Complete A1c Home Test    []  Patient Declined Scheduling Labs or Will Call Back to Schedule    []  Patient Will Schedule with External Provider / Order Routed, & Care Team Updated if Applicable           Primary Care Appointment []  Primary Care Appt Scheduled    []  Patient Declined Scheduling or Will Call Back to Schedule    []  Pt Established with External Provider, Updated Care Team, & Informed Pt to Notify Payor if Applicable           Medication Adherence /    Statin Use []  Primary Care Appointment Scheduled    []  Patient Reminded to  Prescription    []  Patient Declined, Provider Notified if Needed    []  Sent Provider Message to Review to Evaluate Pt for Statin, Add Exclusion Dx Codes, Document   Exclusion in Problem List, Change Statin Intensity Level to Moderate or High Intensity if Applicable                Osteoporosis Screening []  Dexa Order Placed    []  Dexa Appointment Scheduled    []  External Records Requested & Care Team Updated    []  External Records Uploaded, Care Team Updated, & History Updated if Applicable    []  Patient Declined Scheduling Dexa or Will Call Back to  Schedule    []  Patient Will Schedule with External Provider / Order Routed & Care Team Updated if Applicable       Additional Notes:.  Post visit Population Health review of encounter with date of service  11/26/24 with Avi. Not  All required HY components in encounter.  Chart is opened needs z00.00 or z00.01 and cpt for age 54.  Followup appt for: 11/28/25 HY

## 2024-11-27 NOTE — PROGRESS NOTES
Subjective     Patient ID: Alisa Olguin is a 54 y.o. female.    Chief Complaint: Annual Exam (Healthy You  [employer])    54-year-old female presents for healthy you, refills.  Discussed care gaps, declines podiatry referral for foot exam at this time but willing to go ahead and update her smear today.  Has never had an abnormal 1 in the past.  Denies any abnormal bleeding she is post menopausal since age of about 43.      Review of Systems   Constitutional: Negative.    HENT: Negative.     Eyes: Negative.    Respiratory: Negative.     Cardiovascular: Negative.    Gastrointestinal: Negative.    Endocrine: Negative.    Musculoskeletal: Negative.    Integumentary:  Negative.   Neurological: Negative.    Psychiatric/Behavioral: Negative.     All other systems reviewed and are negative.      Tobacco Use: Low Risk  (11/26/2024)    Patient History     Smoking Tobacco Use: Never     Smokeless Tobacco Use: Never     Passive Exposure: Never     Review of patient's allergies indicates:  No Known Allergies  Current Outpatient Medications   Medication Instructions    albuterol (VENTOLIN HFA) 90 mcg/actuation inhaler 2 puffs, Inhalation, Every 6 hours PRN, Rescue    azelastine (ASTELIN) 137 mcg, Nasal, 2 times daily    dulaglutide (TRULICITY) 1.5 mg/0.5 mL pen injector INJECT 1.5MG SUB-Q EVERY WEEK    gabapentin (NEURONTIN) 800 mg, Oral, 3 times daily    lisinopriL-hydrochlorothiazide (PRINZIDE,ZESTORETIC) 20-12.5 mg per tablet 1 tablet, Oral, Daily    metFORMIN (GLUCOPHAGE) 750 mg, Oral, 2 times daily with meals    rosuvastatin (CRESTOR) 20 mg, Oral, Nightly     Medications Discontinued During This Encounter   Medication Reason    rosuvastatin (CRESTOR) 20 MG tablet Reorder    gabapentin (NEURONTIN) 800 MG tablet Reorder    lisinopriL-hydrochlorothiazide (PRINZIDE,ZESTORETIC) 20-12.5 mg per tablet Reorder       Past Medical History:   Diagnosis Date    Diabetes mellitus, type 2     Hyperlipidemia     Hypertension      Health  "Maintenance Topics with due status: Not Due       Topic Last Completion Date    Eye Exam 08/23/2024    Diabetes Urine Screening 08/26/2024    Lipid Panel 08/26/2024    Hemoglobin A1c 08/26/2024    Mammogram 09/13/2024    Colorectal Cancer Screening 09/29/2024    Low Dose Statin 11/26/2024    RSV Vaccine (Age 60+ and Pregnant patients) Not Due     Immunization History   Administered Date(s) Administered    Influenza - Quadrivalent - MDCK - PF 11/14/2022    Influenza - Quadrivalent - PF *Preferred* (6 months and older) 11/19/2019, 12/18/2021       Objective     Body mass index is 28.87 kg/m².  Wt Readings from Last 3 Encounters:   11/26/24 73.9 kg (163 lb)   08/26/24 74.4 kg (164 lb)   01/05/24 78.6 kg (173 lb 3.2 oz)     Ht Readings from Last 3 Encounters:   11/26/24 5' 3" (1.6 m)   08/26/24 5' 3" (1.6 m)   01/05/24 5' 3" (1.6 m)     BP Readings from Last 3 Encounters:   11/26/24 113/81   08/26/24 123/84   01/05/24 127/86     Temp Readings from Last 3 Encounters:   11/26/24 99.2 °F (37.3 °C) (Oral)   08/26/24 98.4 °F (36.9 °C) (Oral)   01/05/24 98.4 °F (36.9 °C) (Oral)     Pulse Readings from Last 3 Encounters:   11/26/24 80   08/26/24 67   01/05/24 76     Resp Readings from Last 3 Encounters:   11/26/24 18   08/26/24 18   03/31/23 18     PF Readings from Last 3 Encounters:   No data found for PF       Physical Exam  Vitals and nursing note reviewed. Exam conducted with a chaperone present.   Constitutional:       Appearance: Normal appearance.   HENT:      Head: Normocephalic and atraumatic.      Mouth/Throat:      Mouth: Mucous membranes are moist.      Pharynx: Oropharynx is clear.   Eyes:      Extraocular Movements: Extraocular movements intact.      Conjunctiva/sclera: Conjunctivae normal.      Pupils: Pupils are equal, round, and reactive to light.   Cardiovascular:      Rate and Rhythm: Normal rate and regular rhythm.      Pulses: Normal pulses.      Heart sounds: Normal heart sounds.   Pulmonary:      Effort: " Pulmonary effort is normal.      Breath sounds: Normal breath sounds.   Genitourinary:     Pubic Area: No rash.       Labia:         Right: No rash.         Left: No rash.       Vagina: Normal.      Cervix: Normal.      Uterus: Normal.       Adnexa: Right adnexa normal and left adnexa normal.      Rectum: Normal.   Musculoskeletal:         General: Normal range of motion.      Cervical back: Normal range of motion and neck supple.   Skin:     General: Skin is warm and dry.      Capillary Refill: Capillary refill takes less than 2 seconds.   Neurological:      General: No focal deficit present.      Mental Status: She is alert and oriented to person, place, and time. Mental status is at baseline.   Psychiatric:         Mood and Affect: Mood normal.         Behavior: Behavior normal.         Thought Content: Thought content normal.         Judgment: Judgment normal.         Assessment and Plan     Problem List Items Addressed This Visit       Essential hypertension    Relevant Medications    lisinopriL-hydrochlorothiazide (PRINZIDE,ZESTORETIC) 20-12.5 mg per tablet    Hyperlipidemia    Relevant Medications    rosuvastatin (CRESTOR) 20 MG tablet     Other Visit Diagnoses       Screening for cervical cancer    -  Primary    Relevant Orders    ThinPrep Pap Test    Neuropathy        Relevant Medications    gabapentin (NEURONTIN) 800 MG tablet            Plan:  Pap smear, refills      I have reviewed the medications, allergies, and problem list.     Goal Actions:    What type of visit is the patient here for today?: Healthy You  Does the patient consent to enroll in [facility] Healthy?: No  Is this a Wellness Follow Up?: No  What is your overall wellness goal? (select at least one): Lifestyle modifications  Choose 3: Lifestyle, Nutrition, Exercise  Lifestyle Actions : Obtain yearly mammogram  Nurtrition Actions: Avoid adding table salt, drink 8-10 glasses of water per day, Eat heart healthy diet  Exercise Actions: Recommend  physical activity 30 minutes per day 3-5 times/week
